# Patient Record
Sex: FEMALE | Race: WHITE | NOT HISPANIC OR LATINO | Employment: PART TIME | URBAN - METROPOLITAN AREA
[De-identification: names, ages, dates, MRNs, and addresses within clinical notes are randomized per-mention and may not be internally consistent; named-entity substitution may affect disease eponyms.]

---

## 2018-03-12 ENCOUNTER — OFFICE VISIT (OUTPATIENT)
Dept: URGENT CARE | Facility: CLINIC | Age: 33
End: 2018-03-12
Payer: COMMERCIAL

## 2018-03-12 VITALS
HEART RATE: 110 BPM | WEIGHT: 293 LBS | BODY MASS INDEX: 39.68 KG/M2 | TEMPERATURE: 100.7 F | DIASTOLIC BLOOD PRESSURE: 84 MMHG | OXYGEN SATURATION: 98 % | HEIGHT: 72 IN | RESPIRATION RATE: 20 BRPM | SYSTOLIC BLOOD PRESSURE: 136 MMHG

## 2018-03-12 DIAGNOSIS — J20.9 ACUTE BRONCHITIS, UNSPECIFIED ORGANISM: Primary | ICD-10-CM

## 2018-03-12 PROCEDURE — 99203 OFFICE O/P NEW LOW 30 MIN: CPT | Performed by: PHYSICIAN ASSISTANT

## 2018-03-12 RX ORDER — AZITHROMYCIN 250 MG/1
TABLET, FILM COATED ORAL
Qty: 6 TABLET | Refills: 0 | Status: SHIPPED | OUTPATIENT
Start: 2018-03-12 | End: 2018-03-16

## 2018-03-12 RX ORDER — METHYLPREDNISOLONE 4 MG/1
TABLET ORAL
Qty: 21 TABLET | Refills: 0 | Status: SHIPPED | OUTPATIENT
Start: 2018-03-12 | End: 2019-09-30 | Stop reason: ALTCHOICE

## 2018-03-12 NOTE — PATIENT INSTRUCTIONS

## 2018-03-12 NOTE — PROGRESS NOTES
[facility]        NAME: Mague Shipman is a 27 y o  female  : 1985    MRN: [id number]  DATE: 2018  TIME: 2:19 PM    Assessment and Plan   Acute bronchitis, unspecified organism [J20 9]  1  Acute bronchitis, unspecified organism  azithromycin (ZITHROMAX) 250 mg tablet    Methylprednisolone 4 MG TBPK         Patient Instructions     Seek care immediately if:   · You cough up blood  · Your lips or fingernails turn blue  · You feel like you are not getting enough air when you breathe  Follow up with PCP in 3-5 days  Proceed to  ER if symptoms worsen  Chief Complaint     Chief Complaint   Patient presents with    Cold Like Symptoms     C/O harsh cough, post nasal drip, sore throat and loss of voice x 3 days  Temp now is 100 7  History of Present Illness       Patient presents with a 3 day history of productive cough with yellow colored mucus burning her chest with coughing clear postnasal drip mild sore throat and low-grade fever  No abdominal pain nausea vomiting or diarrhea  Review of Systems   Review of Systems   Constitutional: Positive for activity change and fever  Negative for chills  HENT: Positive for congestion, postnasal drip, rhinorrhea, sore throat and voice change  Negative for ear pain, sneezing and trouble swallowing  Eyes: Negative for redness  Respiratory: Positive for cough  Negative for chest tightness, shortness of breath and wheezing  Gastrointestinal: Negative for abdominal pain, diarrhea, nausea and vomiting  Musculoskeletal: Negative for myalgias  Skin: Negative for rash  Neurological: Negative for dizziness, light-headedness and headaches  Hematological: Negative for adenopathy           Current Medications       Current Outpatient Prescriptions:     azithromycin (ZITHROMAX) 250 mg tablet, Take 2 tablets today then 1 tablet daily x 4 days, Disp: 6 tablet, Rfl: 0    Methylprednisolone 4 MG TBPK, Use as directed on package, Disp: 21 tablet, Rfl: 0    Current Allergies     Allergies as of 2018    (No Known Allergies)            The following portions of the patient's history were reviewed and updated as appropriate: allergies, current medications, past family history, past medical history, past social history, past surgical history and problem list      History reviewed  No pertinent past medical history  Past Surgical History:   Procedure Laterality Date     SECTION      x2    ENDOMETRIAL ABLATION         No family history on file  Medications have been verified  Objective   /84   Pulse (!) 110   Temp (!) 100 7 °F (38 2 °C)   Resp 20   Ht 6' 0 99" (1 854 m)   Wt 136 kg (299 lb 13 2 oz)   SpO2 98%   BMI 39 57 kg/m²        Physical Exam     Physical Exam   Constitutional: She is oriented to person, place, and time  She appears well-developed and well-nourished  HENT:   Head: Normocephalic and atraumatic  Right Ear: External ear normal    Left Ear: External ear normal    Nose: Nose normal    Mouth/Throat: Oropharynx is clear and moist    Eyes: Conjunctivae are normal    Neck: Neck supple  Cardiovascular: Normal rate, regular rhythm and normal heart sounds  Pulmonary/Chest: Effort normal    Musculoskeletal: Normal range of motion  Lymphadenopathy:     She has no cervical adenopathy  Neurological: She is alert and oriented to person, place, and time  Skin: Skin is warm and dry  No rash noted  Psychiatric: She has a normal mood and affect

## 2019-02-19 ENCOUNTER — TRANSCRIBE ORDERS (OUTPATIENT)
Dept: ADMINISTRATIVE | Facility: HOSPITAL | Age: 34
End: 2019-02-19

## 2019-02-19 DIAGNOSIS — N63.10 LUMP OF RIGHT BREAST: Primary | ICD-10-CM

## 2019-03-04 ENCOUNTER — HOSPITAL ENCOUNTER (OUTPATIENT)
Dept: MAMMOGRAPHY | Facility: HOSPITAL | Age: 34
Discharge: HOME | End: 2019-03-04
Payer: COMMERCIAL

## 2019-03-04 ENCOUNTER — HOSPITAL ENCOUNTER (OUTPATIENT)
Dept: ULTRASOUND IMAGING | Facility: HOSPITAL | Age: 34
Discharge: HOME | End: 2019-03-04
Payer: COMMERCIAL

## 2019-03-04 VITALS — BODY MASS INDEX: 39.68 KG/M2 | HEIGHT: 72 IN | WEIGHT: 293 LBS

## 2019-03-04 DIAGNOSIS — N63.10 LUMP OF RIGHT BREAST: ICD-10-CM

## 2019-03-04 PROCEDURE — 76642 ULTRASOUND BREAST LIMITED: CPT

## 2019-03-04 PROCEDURE — 77066 DX MAMMO INCL CAD BI: CPT

## 2019-03-04 PROCEDURE — G0279 TOMOSYNTHESIS, MAMMO: HCPCS

## 2019-09-30 ENCOUNTER — OFFICE VISIT (OUTPATIENT)
Dept: FAMILY MEDICINE CLINIC | Facility: CLINIC | Age: 34
End: 2019-09-30
Payer: COMMERCIAL

## 2019-09-30 VITALS
TEMPERATURE: 99.5 F | BODY MASS INDEX: 39.68 KG/M2 | HEART RATE: 87 BPM | OXYGEN SATURATION: 99 % | DIASTOLIC BLOOD PRESSURE: 86 MMHG | HEIGHT: 72 IN | SYSTOLIC BLOOD PRESSURE: 130 MMHG | WEIGHT: 293 LBS

## 2019-09-30 DIAGNOSIS — J01.90 ACUTE NON-RECURRENT SINUSITIS, UNSPECIFIED LOCATION: Primary | ICD-10-CM

## 2019-09-30 DIAGNOSIS — H65.01 NON-RECURRENT ACUTE SEROUS OTITIS MEDIA OF RIGHT EAR: ICD-10-CM

## 2019-09-30 PROCEDURE — 3008F BODY MASS INDEX DOCD: CPT | Performed by: FAMILY MEDICINE

## 2019-09-30 PROCEDURE — 99213 OFFICE O/P EST LOW 20 MIN: CPT | Performed by: FAMILY MEDICINE

## 2019-09-30 RX ORDER — SULFAMETHOXAZOLE AND TRIMETHOPRIM 800; 160 MG/1; MG/1
1 TABLET ORAL 2 TIMES DAILY
Qty: 20 TABLET | Refills: 0 | Status: SHIPPED | OUTPATIENT
Start: 2019-09-30 | End: 2019-10-10

## 2019-09-30 NOTE — PROGRESS NOTES
Assessment/Plan:    Acute non-recurrent sinusitis  Patient has had problems with amoxicillin in the past causing vaginal yeast infections  She requested a different antibiotic  I think that Bactrim DS should cover her for her otitis media as well as her sinusitis  I started the patient on Bactrim DS, take 1 tablet twice a day for 10 days  Patient has been advised to increase her fluid intake and rest   She can take an over-the-counter nasal decongestant as needed  Return to office if no improvement or worsens  Non-recurrent acute serous otitis media of right ear  As noted above, patient was started on Bactrim DS  I did not visualize any otorrhea  Therefore, I did not prescribe any ear drops  Patient was instructed to call me if she develops recurrent otorrhea  Diagnoses and all orders for this visit:    Acute non-recurrent sinusitis, unspecified location  -     sulfamethoxazole-trimethoprim (BACTRIM DS) 800-160 mg per tablet; Take 1 tablet by mouth 2 (two) times a day for 10 days    Non-recurrent acute serous otitis media of right ear          Subjective:      Patient ID: Dina Vickers is a 37 y o  female  This patient is a 41-year-old white female presents to the office today complaining of a right earache  She thought her right ear felt like it may have been draining  It felt wet  She reports sore throat, nasal congestion, runny nose, and a cough  Her cough is productive of yellow sputum  She denies any sinus pain or sinus pressure  She denies any fever or shaking chills  She denies any shortness of breath  The following portions of the patient's history were reviewed and updated as appropriate: allergies, current medications, past family history, past medical history, past social history, past surgical history and problem list     Review of Systems   Cardiovascular: Negative for chest pain, palpitations and leg swelling           Objective:      /86   Pulse 87   Temp 99 5 °F (37 5 °C) (Tympanic)   Ht 6' 1" (1 854 m)   Wt (!) 152 kg (336 lb 1 6 oz)   SpO2 99%   BMI 44 34 kg/m²          Physical Exam   Constitutional:   Patient is a 57-year-old white female who appears her stated age  She is nonseptic in appearance and in no apparent distress  HENT:   Head: Normocephalic and atraumatic  Right Ear: External ear normal    Left Ear: External ear normal    Mouth/Throat: Oropharynx is clear and moist  No oropharyngeal exudate  Left tympanic membrane was normal   There was no otorrhea noted  There was injection present of the right tympanic membrane  There is  tenderness to palpation over the maxillary sinuses  Nose was only mildly congested  Eyes: Pupils are equal, round, and reactive to light  Conjunctivae are normal  No scleral icterus  Neck: Neck supple  No tracheal deviation present  No thyromegaly present  Cardiovascular: Normal rate, regular rhythm and normal heart sounds  Exam reveals no gallop and no friction rub  No murmur heard  Pulmonary/Chest: Effort normal and breath sounds normal  No stridor  No respiratory distress  She has no wheezes  She has no rales  Lymphadenopathy:     She has no cervical adenopathy  Vitals reviewed  extremities:  Without cyanosis, clubbing, or edema

## 2019-09-30 NOTE — ASSESSMENT & PLAN NOTE
As noted above, patient was started on Bactrim DS  I did not visualize any otorrhea  Therefore, I did not prescribe any ear drops  Patient was instructed to call me if she develops recurrent otorrhea

## 2019-09-30 NOTE — ASSESSMENT & PLAN NOTE
Patient has had problems with amoxicillin in the past causing vaginal yeast infections  She requested a different antibiotic  I think that Bactrim DS should cover her for her otitis media as well as her sinusitis  I started the patient on Bactrim DS, take 1 tablet twice a day for 10 days  Patient has been advised to increase her fluid intake and rest   She can take an over-the-counter nasal decongestant as needed  Return to office if no improvement or worsens

## 2019-09-30 NOTE — PATIENT INSTRUCTIONS
Sinusitis   Bing Lisa CN & Jemima Batista PJ: Sinusitis  In: Morales HILL, ed  The 5-Minute Clinical Consult 2017, 25th ed  [facility],[address] [place] [address], 2017  Rolo ALTAMIRANO & Jane Reagan ME: Sinusitis (Rhinosinusitis)  In: Odalis Lacey, Suzanne YOST, [facility], et al, eds  [address] Emergency Medicine Consult, 5th ed  [facility],[address] [place] [address], 5476  Afarian HM: Sinusitis and Rhinitis  In: Carroll Niño, ed  [facility] Clinical Practice of Emergency Medicine, 6th ed  [facility],[address] [place] [address], 7414  Donato ROSENTHAL & Riley J: Sinusitis  In: Erick Lopez MD, ed  The 5-Minute Pediatric Consult Standard, 7th ed  [facility],[address] [place] [address], 4189  Pauly YOST, Paresh HARPER, Cody SS, et al: Clinical practice guideline (update): adult sinusitis  Otolaryngol Head Neck Surg 2015; 152(2 Suppl):S1-S39  Nancy Second AK: Sinusitis  In: Morales HILL, ed  The 5-Minute Clinical Consult 2012, 20th ed  [facility],[address] [place] [address], 2012 © 2017 [address]  Information is for End User's use only and may not be sold, redistributed or otherwise used for commercial purposes  All illustrations and images included in CareNotes® are the copyrighted property of [employer] , [employer]  or Delon Navarrete  The above information is an  only  It is not intended as medical advice for individual conditions or treatments  Talk to your doctor, nurse or pharmacist before following any medical regimen to see if it is safe and effective for you

## 2020-02-18 ENCOUNTER — OFFICE VISIT (OUTPATIENT)
Dept: FAMILY MEDICINE CLINIC | Facility: CLINIC | Age: 35
End: 2020-02-18
Payer: COMMERCIAL

## 2020-02-18 VITALS
OXYGEN SATURATION: 100 % | TEMPERATURE: 99.3 F | DIASTOLIC BLOOD PRESSURE: 82 MMHG | SYSTOLIC BLOOD PRESSURE: 132 MMHG | BODY MASS INDEX: 39.68 KG/M2 | WEIGHT: 293 LBS | HEIGHT: 72 IN | HEART RATE: 75 BPM

## 2020-02-18 DIAGNOSIS — J01.90 ACUTE NON-RECURRENT SINUSITIS, UNSPECIFIED LOCATION: Primary | ICD-10-CM

## 2020-02-18 PROCEDURE — 3008F BODY MASS INDEX DOCD: CPT | Performed by: FAMILY MEDICINE

## 2020-02-18 PROCEDURE — 1036F TOBACCO NON-USER: CPT | Performed by: FAMILY MEDICINE

## 2020-02-18 PROCEDURE — 99213 OFFICE O/P EST LOW 20 MIN: CPT | Performed by: FAMILY MEDICINE

## 2020-02-18 RX ORDER — CEFUROXIME AXETIL 250 MG/1
250 TABLET ORAL EVERY 12 HOURS SCHEDULED
Qty: 20 TABLET | Refills: 0 | Status: SHIPPED | OUTPATIENT
Start: 2020-02-18 | End: 2020-02-28

## 2020-02-18 RX ORDER — BENZONATATE 100 MG/1
CAPSULE ORAL
Qty: 40 CAPSULE | Refills: 0 | Status: SHIPPED | OUTPATIENT
Start: 2020-02-18 | End: 2020-03-03

## 2020-02-18 NOTE — PATIENT INSTRUCTIONS
Sinusitis   WHAT YOU NEED TO KNOW:   What is sinusitis? Sinusitis is inflammation or infection of your sinuses  It is most often caused by a virus  Acute sinusitis may last up to 12 weeks  Chronic sinusitis lasts longer than 12 weeks  Recurrent sinusitis means you have 4 or more times in 1 year  What increases my risk for sinusitis? · Medical conditions, such as an upper respiratory infection, allergies, asthma, or cystic fibrosis    · Dental infections or procedures, such as gum infections, tooth decay, or a root canal    · Smoking    · Abnormal sinus structure, such as nasal growths, swollen tonsils, or a deviated septum    · A weak immune system, from diseases such as diabetes or HIV  What are the signs and symptoms of sinusitis? · Fever    · Pain, pressure, redness, or swelling around the forehead, cheeks, or eyes    · Thick yellow or green discharge from your nose    · Tenderness when you touch your face over your sinuses    · Dry cough that happens mostly at night or when you lie down    · Headache and face pain that is worse when you lean forward    · Tooth pain, or pain when you chew  How is sinusitis diagnosed? Your healthcare provider will examine you and ask about your symptoms  He or she will check inside your nose using a nasal speculum  This is a small tool used to open your nostrils  A sample of the mucus from your nose may show what germ is causing your infection  How is sinusitis treated? Your symptoms may go away on their own  Your healthcare provider may recommend watchful waiting for up to 10 days before starting antibiotics  You may  need any of the following:  · Acetaminophen  decreases pain and fever  It is available without a doctor's order  Ask how much to take and how often to take it  Follow directions   Read the labels of all other medicines you are using to see if they also contain acetaminophen, or ask your doctor or pharmacist  Acetaminophen can cause liver damage if not taken correctly  Do not use more than 4 grams (4,000 milligrams) total of acetaminophen in one day  · NSAIDs , such as ibuprofen, help decrease swelling, pain, and fever  This medicine is available with or without a doctor's order  NSAIDs can cause stomach bleeding or kidney problems in certain people  If you take blood thinner medicine, always ask your healthcare provider if NSAIDs are safe for you  Always read the medicine label and follow directions  · Nasal steroid sprays  may help decrease inflammation in your nose and sinuses  · Decongestants  help reduce swelling and drain mucus in the nose and sinuses  They may help you breathe easier  · Antihistamines  help dry mucus in the nose and relieve sneezing  · Antibiotics  help treat or prevent a bacterial infection  How can I manage my symptoms? · Rinse your sinuses  Use a sinus rinse device to rinse your nasal passages with a saline (salt water) solution or distilled water  Do not use tap water  This will help thin the mucus in your nose and rinse away pollen and dirt  It will also help reduce swelling so you can breathe normally  Ask your healthcare provider how often to do this  · Breathe in steam   Heat a bowl of water until you see steam  Lean over the bowl and make a tent over your head with a large towel  Breathe deeply for about 20 minutes  Be careful not to get too close to the steam or burn yourself  Do this 3 times a day  You can also breathe deeply when you take a hot shower  · Sleep with your head elevated  Place an extra pillow under your head before you go to sleep to help your sinuses drain  · Drink liquids as directed  Ask your healthcare provider how much liquid to drink each day and which liquids are best for you  Liquids will thin the mucus in your nose and help it drain  Avoid drinks that contain alcohol or caffeine  · Do not smoke, and avoid secondhand smoke    Nicotine and other chemicals in cigarettes and cigars can make your symptoms worse  Ask your healthcare provider for information if you currently smoke and need help to quit  E-cigarettes or smokeless tobacco still contain nicotine  Talk to your healthcare provider before you use these products  How can I help prevent the spread of germs that cause sinusitis? Wash your hands often with soap and water  Wash your hands after you use the bathroom, change a child's diaper, or sneeze  Wash your hands before you prepare or eat food  When should I seek immediate care? · Your eye and eyelid are red, swollen, and painful  · You cannot open your eye  · You have vision changes, such as double vision  · Your eyeball bulges out or you cannot move your eye  · You are more sleepy than normal, or you notice changes in your ability to think, move, or talk  · You have a stiff neck, a fever, or a bad headache  · You have swelling of your forehead or scalp  When should I contact my healthcare provider? · Your symptoms do not improve after 3 days  · Your symptoms do not go away after 10 days  · You have nausea and are vomiting  · Your nose is bleeding  · You have questions or concerns about your condition or care  CARE AGREEMENT:   You have the right to help plan your care  Learn about your health condition and how it may be treated  Discuss treatment options with your caregivers to decide what care you want to receive  You always have the right to refuse treatment  The above information is an  only  It is not intended as medical advice for individual conditions or treatments  Talk to your doctor, nurse or pharmacist before following any medical regimen to see if it is safe and effective for you  © 2017 [employer] [employer] Information is for End User's use only and may not be sold, redistributed or otherwise used for commercial purposes   All illustrations and images included in CareNotes® are the copyrighted property of A D A M , Inc  or Delon Navarrete

## 2020-02-18 NOTE — PROGRESS NOTES
Assessment/Plan:    Acute non-recurrent sinusitis  The patient was started on Ceftin 250 mg  She will take 1 tablet twice a day with food for 10 days  She has been advised to increase her fluid intake and rest   She may take Tylenol as needed  For cough, she was prescribed benzonatate 100 mg  She may take 1 or 2 3 times per day as needed for cough  Patient has been advised to return to office if no improvement or worsens  I noted the patient's 18 lb weight gain  The patient is trying hard to lose weight  I commended on her weight loss and encouraged her to continue to try to lose weight  Diagnoses and all orders for this visit:    Acute non-recurrent sinusitis, unspecified location  -     benzonatate (TESSALON PERLES) 100 mg capsule; Take 1-2 three times a day as needed for cough  -     cefuroxime (CEFTIN) 250 mg tablet; Take 1 tablet (250 mg total) by mouth every 12 (twelve) hours for 10 days           Subjective:      Patient ID: Patrizia Pratt is a 37 y o  female  This is a 56-year-old white female presents to the office today complaining of sinus pain and sinus pressure  She complains of cough, nasal congestion, a sore throat  Her symptoms began on February 13  She denies any fever or chills  She denies shortness of breath  She reports a green nasal discharge  She has also noted green sputum  The following portions of the patient's history were reviewed and updated as appropriate: allergies, current medications, past family history, past medical history, past social history, past surgical history and problem list     Review of Systems   Gastrointestinal: Negative for abdominal distention, constipation, diarrhea, nausea and vomiting           Objective:      /82 (BP Location: Left arm, Patient Position: Sitting, Cuff Size: Large)   Pulse 75   Temp 99 3 °F (37 4 °C) (Tympanic)   Ht 6' 1" (1 854 m)   Wt (!) 145 kg (318 lb 9 6 oz)   SpO2 100%   BMI 42 03 kg/m²          Physical Exam   Constitutional: She appears well-developed and well-nourished  No distress  HENT:   Head: Normocephalic and atraumatic  Right Ear: External ear normal    Left Ear: External ear normal    Mouth/Throat: Oropharynx is clear and moist  No oropharyngeal exudate  Tympanic membranes are clear  Severe nasal congestion is present  There was no rhinorrhea noted  There is tenderness noted to palpation over the maxillary sinuses bilaterally  Eyes: Pupils are equal, round, and reactive to light  Conjunctivae are normal  No scleral icterus  Neck: Neck supple  No tracheal deviation present  No thyromegaly present  Cardiovascular: Normal rate, regular rhythm and normal heart sounds  Exam reveals no gallop and no friction rub  No murmur heard  Pulmonary/Chest: Effort normal and breath sounds normal  No stridor  No respiratory distress  She has no wheezes  She has no rales  Abdominal: Soft  Bowel sounds are normal  She exhibits no distension and no mass  There is no tenderness  There is no guarding  There is no organomegaly   Lymphadenopathy:     She has no cervical adenopathy  Vitals reviewed      extremities:  Without cyanosis, clubbing, or edema

## 2020-02-19 NOTE — ASSESSMENT & PLAN NOTE
The patient was started on Ceftin 250 mg  She will take 1 tablet twice a day with food for 10 days  She has been advised to increase her fluid intake and rest   She may take Tylenol as needed  For cough, she was prescribed benzonatate 100 mg  She may take 1 or 2 3 times per day as needed for cough  Patient has been advised to return to office if no improvement or worsens  I noted the patient's 18 lb weight gain  The patient is trying hard to lose weight  I commended on her weight loss and encouraged her to continue to try to lose weight

## 2020-03-04 ENCOUNTER — TELEPHONE (OUTPATIENT)
Dept: FAMILY MEDICINE CLINIC | Facility: CLINIC | Age: 35
End: 2020-03-04

## 2020-03-04 DIAGNOSIS — J01.90 ACUTE NON-RECURRENT SINUSITIS, UNSPECIFIED LOCATION: Primary | ICD-10-CM

## 2020-03-04 RX ORDER — CEFDINIR 300 MG/1
300 CAPSULE ORAL EVERY 12 HOURS SCHEDULED
Qty: 20 CAPSULE | Refills: 0 | Status: SHIPPED | OUTPATIENT
Start: 2020-03-04 | End: 2020-03-14

## 2020-03-04 NOTE — TELEPHONE ENCOUNTER
Pt seen in office 2 wks ago, felt well for a few days, but now has stuffy, runny nose, cough, yellow/green mucus  No fever/ x3 days     Taking mussenx        Arlen Espinosa

## 2020-10-30 DIAGNOSIS — Z20.822 ENCOUNTER FOR SCREENING LABORATORY TESTING FOR COVID-19 VIRUS: Primary | ICD-10-CM

## 2020-10-30 DIAGNOSIS — Z20.822 ENCOUNTER FOR SCREENING LABORATORY TESTING FOR COVID-19 VIRUS: ICD-10-CM

## 2020-10-30 PROCEDURE — U0003 INFECTIOUS AGENT DETECTION BY NUCLEIC ACID (DNA OR RNA); SEVERE ACUTE RESPIRATORY SYNDROME CORONAVIRUS 2 (SARS-COV-2) (CORONAVIRUS DISEASE [COVID-19]), AMPLIFIED PROBE TECHNIQUE, MAKING USE OF HIGH THROUGHPUT TECHNOLOGIES AS DESCRIBED BY CMS-2020-01-R: HCPCS | Performed by: FAMILY MEDICINE

## 2020-10-31 LAB — SARS-COV-2 RNA SPEC QL NAA+PROBE: NOT DETECTED

## 2020-11-02 ENCOUNTER — TELEPHONE (OUTPATIENT)
Dept: FAMILY MEDICINE CLINIC | Facility: CLINIC | Age: 35
End: 2020-11-02

## 2020-11-23 ENCOUNTER — OFFICE VISIT (OUTPATIENT)
Dept: FAMILY MEDICINE CLINIC | Facility: CLINIC | Age: 35
End: 2020-11-23
Payer: COMMERCIAL

## 2020-11-23 VITALS
WEIGHT: 293 LBS | HEIGHT: 72 IN | DIASTOLIC BLOOD PRESSURE: 82 MMHG | HEART RATE: 103 BPM | BODY MASS INDEX: 39.68 KG/M2 | SYSTOLIC BLOOD PRESSURE: 130 MMHG | TEMPERATURE: 97.6 F | OXYGEN SATURATION: 98 %

## 2020-11-23 DIAGNOSIS — Z02.1 ENCOUNTER FOR PRE-EMPLOYMENT EXAMINATION: Primary | ICD-10-CM

## 2020-11-23 DIAGNOSIS — Z23 IMMUNIZATION DUE: ICD-10-CM

## 2020-11-23 PROCEDURE — 1036F TOBACCO NON-USER: CPT | Performed by: FAMILY MEDICINE

## 2020-11-23 PROCEDURE — 90686 IIV4 VACC NO PRSV 0.5 ML IM: CPT

## 2020-11-23 PROCEDURE — 90471 IMMUNIZATION ADMIN: CPT

## 2020-11-23 PROCEDURE — 86580 TB INTRADERMAL TEST: CPT

## 2020-11-23 PROCEDURE — 3725F SCREEN DEPRESSION PERFORMED: CPT | Performed by: FAMILY MEDICINE

## 2020-11-23 PROCEDURE — 3008F BODY MASS INDEX DOCD: CPT | Performed by: FAMILY MEDICINE

## 2020-11-23 PROCEDURE — 99213 OFFICE O/P EST LOW 20 MIN: CPT | Performed by: FAMILY MEDICINE

## 2021-11-10 ENCOUNTER — OFFICE VISIT (OUTPATIENT)
Dept: URGENT CARE | Facility: CLINIC | Age: 36
End: 2021-11-10
Payer: COMMERCIAL

## 2021-11-10 VITALS
RESPIRATION RATE: 18 BRPM | OXYGEN SATURATION: 98 % | HEIGHT: 72 IN | HEART RATE: 79 BPM | WEIGHT: 293 LBS | TEMPERATURE: 97.9 F | BODY MASS INDEX: 39.68 KG/M2

## 2021-11-10 DIAGNOSIS — H66.92 LEFT OTITIS MEDIA, UNSPECIFIED OTITIS MEDIA TYPE: Primary | ICD-10-CM

## 2021-11-10 PROCEDURE — 99213 OFFICE O/P EST LOW 20 MIN: CPT | Performed by: PHYSICIAN ASSISTANT

## 2021-11-10 RX ORDER — AMOXICILLIN 500 MG/1
500 TABLET, FILM COATED ORAL 2 TIMES DAILY
Qty: 14 TABLET | Refills: 0 | Status: SHIPPED | OUTPATIENT
Start: 2021-11-10 | End: 2021-11-17

## 2022-05-20 ENCOUNTER — OFFICE VISIT (OUTPATIENT)
Dept: URGENT CARE | Facility: CLINIC | Age: 37
End: 2022-05-20
Payer: COMMERCIAL

## 2022-05-20 VITALS — TEMPERATURE: 98.4 F | RESPIRATION RATE: 18 BRPM | HEART RATE: 94 BPM | OXYGEN SATURATION: 98 %

## 2022-05-20 DIAGNOSIS — J20.8 ACUTE BRONCHITIS DUE TO OTHER SPECIFIED ORGANISMS: Primary | ICD-10-CM

## 2022-05-20 PROCEDURE — 99213 OFFICE O/P EST LOW 20 MIN: CPT | Performed by: NURSE PRACTITIONER

## 2022-05-20 RX ORDER — AZITHROMYCIN 250 MG/1
TABLET, FILM COATED ORAL
Qty: 6 TABLET | Refills: 0 | Status: SHIPPED | OUTPATIENT
Start: 2022-05-20 | End: 2022-05-24

## 2022-05-20 NOTE — PROGRESS NOTES
[facility]        NAME: Yaritza Jensen is a 43 y o  female  : 1985    MRN: [id number]  DATE: May 20, 2022  TIME: 3:54 PM    Assessment and Plan   Acute bronchitis due to other specified organisms [J20 8]  1  Acute bronchitis due to other specified organisms  azithromycin (ZITHROMAX) 250 mg tablet         Patient Instructions       Follow up with PCP in 3-5 days  Proceed to  ER if symptoms worsen  You appear to have bronchitis  Take the azithromycin as prescribed  You are to get an OTC cold relief product such as dayquil as well  Follow up with your PCP  Go to the eD if symptoms worsen      Chief Complaint     Chief Complaint   Patient presents with    Cough     Stuffy/runny nose, started 2 weeks ago         History of Present Illness       This is a 43year old female who states has had cough, congestion x 2 weeks and over the last few days has gotten worse  She states she has only been taking mucinex w/o relief  + covid vaccination  Denies fevers, chills, n/v/d  Coughing up yellow phlegm       Review of Systems   Review of Systems   Constitutional: Negative  HENT: Positive for congestion and sore throat  Eyes: Negative  Respiratory: Positive for cough  Cardiovascular: Negative  Gastrointestinal: Negative  Endocrine: Negative  Genitourinary: Negative  Musculoskeletal: Negative  Skin: Negative  Allergic/Immunologic: Negative  Neurological: Negative  Hematological: Negative  Psychiatric/Behavioral: Negative            Current Medications       Current Outpatient Medications:     azithromycin (ZITHROMAX) 250 mg tablet, Take 2 tablets today then 1 tablet daily x 4 days, Disp: 6 tablet, Rfl: 0    Current Allergies     Allergies as of 2022    (No Known Allergies)            The following portions of the patient's history were reviewed and updated as appropriate: allergies, current medications, past family history, past medical history, past social history, past surgical history and problem list      Past Medical History:   Diagnosis Date    Non-recurrent acute serous otitis media of right ear 2019       Past Surgical History:   Procedure Laterality Date     SECTION      x2    ENDOMETRIAL ABLATION         Family History   Problem Relation Age of Onset    Thyroid cancer Mother     No Known Problems Father          Medications have been verified  Objective   Pulse 94   Temp 98 4 °F (36 9 °C)   Resp 18   SpO2 98%   No LMP recorded  Physical Exam     Physical Exam  Vitals and nursing note reviewed  Constitutional:       General: She is not in acute distress  Appearance: Normal appearance  She is obese  She is not ill-appearing, toxic-appearing or diaphoretic  HENT:      Head: Normocephalic and atraumatic  Right Ear: Tympanic membrane and ear canal normal       Left Ear: Tympanic membrane normal       Nose: Congestion and rhinorrhea present  Mouth/Throat:      Mouth: Mucous membranes are moist       Pharynx: Oropharynx is clear  No oropharyngeal exudate or posterior oropharyngeal erythema  Eyes:      Extraocular Movements: Extraocular movements intact  Cardiovascular:      Rate and Rhythm: Normal rate and regular rhythm  Pulses: Normal pulses  Heart sounds: Normal heart sounds  Pulmonary:      Effort: Pulmonary effort is normal       Breath sounds: Rhonchi present  Comments: Coarse rhonchi through out no wheezes   Abdominal:      General: There is no distension  Palpations: Abdomen is soft  Tenderness: There is no abdominal tenderness  Musculoskeletal:         General: Normal range of motion  Cervical back: Normal range of motion and neck supple  Skin:     General: Skin is warm and dry  Neurological:      General: No focal deficit present  Mental Status: She is alert and oriented to person, place, and time     Psychiatric:         Mood and Affect: Mood normal  Behavior: Behavior normal          Thought Content:  Thought content normal          Judgment: Judgment normal

## 2022-05-20 NOTE — PATIENT INSTRUCTIONS
You appear to have bronchitis  Take the azithromycin as prescribed    You are to get an OTC cold relief product such as dayquil as well  Follow up with your PCP  Go to the eD if symptoms worsen

## 2022-05-23 ENCOUNTER — TELEPHONE (OUTPATIENT)
Dept: FAMILY MEDICINE CLINIC | Facility: CLINIC | Age: 37
End: 2022-05-23

## 2022-05-23 NOTE — TELEPHONE ENCOUNTER
I haven't seen this patient in 2 years  Needs an appointment  Don't know if they COVID tested her  They should have

## 2022-05-23 NOTE — TELEPHONE ENCOUNTER
Pt stats she isn't feeling any better after taking the Sherly Flakes she was given at [facility] Friday 05/20/22  Stats she is still coughing and congested, has been for 2 wks now with no fever      Please advise

## 2022-05-23 NOTE — TELEPHONE ENCOUNTER
Pt understands she has one more day with her antibiotic and will notify the [facility] if she feels worse  Pt agrees to call our office to schedule an jose when she's ready

## 2022-09-10 ENCOUNTER — OFFICE VISIT (OUTPATIENT)
Dept: URGENT CARE | Facility: CLINIC | Age: 37
End: 2022-09-10
Payer: COMMERCIAL

## 2022-09-10 VITALS
HEIGHT: 72 IN | HEART RATE: 113 BPM | SYSTOLIC BLOOD PRESSURE: 129 MMHG | RESPIRATION RATE: 18 BRPM | DIASTOLIC BLOOD PRESSURE: 79 MMHG | OXYGEN SATURATION: 98 % | TEMPERATURE: 98.4 F | WEIGHT: 293 LBS | BODY MASS INDEX: 39.68 KG/M2

## 2022-09-10 DIAGNOSIS — J20.8 VIRAL BRONCHITIS: Primary | ICD-10-CM

## 2022-09-10 PROCEDURE — 99213 OFFICE O/P EST LOW 20 MIN: CPT | Performed by: PHYSICIAN ASSISTANT

## 2022-09-10 RX ORDER — BENZONATATE 100 MG/1
100 CAPSULE ORAL 3 TIMES DAILY PRN
Qty: 20 CAPSULE | Refills: 0 | Status: SHIPPED | OUTPATIENT
Start: 2022-09-10

## 2022-09-10 RX ORDER — ALBUTEROL SULFATE 90 UG/1
2 AEROSOL, METERED RESPIRATORY (INHALATION) EVERY 6 HOURS PRN
Qty: 8.5 G | Refills: 0 | Status: SHIPPED | OUTPATIENT
Start: 2022-09-10

## 2022-09-10 RX ORDER — METHYLPREDNISOLONE 4 MG/1
TABLET ORAL
Qty: 1 EACH | Refills: 0 | Status: SHIPPED | OUTPATIENT
Start: 2022-09-10

## 2022-09-10 NOTE — PROGRESS NOTES
[facility]        NAME: Nella Fritz is a 43 y o  female  : 1985    MRN: [id number]  DATE: September 10, 2022  TIME: 12:59 PM    Assessment and Plan   Viral bronchitis [J20 8]  1  Viral bronchitis  benzonatate (TESSALON PERLES) 100 mg capsule    methylPREDNISolone 4 MG tablet therapy pack    albuterol (ProAir HFA) 90 mcg/act inhaler     Patient Instructions     Take medicine as prescribed  Follow up with PCP in 3-5 days  Proceed to  ER if symptoms worsen  Chief Complaint     Chief Complaint   Patient presents with    Cold Like Symptoms     Patient c/o cough, runny nose, chest, and head congestion x 4 days  History of Present Illness     Cough  This is a new problem  The cough is productive of purulent sputum  Associated symptoms include nasal congestion, rhinorrhea, shortness of breath and wheezing  Pertinent negatives include no chest pain, chills, ear pain, fever, myalgias, postnasal drip or sore throat  Treatments tried: mucinex and sudafed  Her past medical history is significant for bronchitis  Patient refuses covid test and CXR  Review of Systems   Review of Systems   Constitutional: Negative for chills, fatigue and fever  HENT: Positive for congestion and rhinorrhea  Negative for ear pain, postnasal drip and sore throat  Respiratory: Positive for cough, chest tightness, shortness of breath and wheezing  Cardiovascular: Negative for chest pain and palpitations  Gastrointestinal: Negative for abdominal pain, diarrhea, nausea and vomiting  Musculoskeletal: Negative for myalgias           Current Medications       Current Outpatient Medications:     albuterol (ProAir HFA) 90 mcg/act inhaler, Inhale 2 puffs every 6 (six) hours as needed for wheezing, Disp: 8 5 g, Rfl: 0    benzonatate (TESSALON PERLES) 100 mg capsule, Take 1 capsule (100 mg total) by mouth 3 (three) times a day as needed for cough, Disp: 20 capsule, Rfl: 0    methylPREDNISolone 4 MG tablet therapy pack, Use as directed on package, Disp: 1 each, Rfl: 0    Current Allergies     Allergies as of 09/10/2022    (No Known Allergies)            The following portions of the patient's history were reviewed and updated as appropriate: allergies, current medications, past family history, past medical history, past social history, past surgical history and problem list      Past Medical History:   Diagnosis Date    Non-recurrent acute serous otitis media of right ear 2019       Past Surgical History:   Procedure Laterality Date     SECTION      x2    CHOLECYSTECTOMY      ENDOMETRIAL ABLATION         Family History   Problem Relation Age of Onset    Thyroid cancer Mother     No Known Problems Father          Medications have been verified  Objective   /79   Pulse (!) 113   Temp 98 4 °F (36 9 °C) (Oral)   Resp 18   Ht 6' 1" (1 854 m)   Wt (!) 152 kg (336 lb)   SpO2 98%   BMI 44 33 kg/m²   No LMP recorded  Patient has had an ablation  Physical Exam     Physical Exam  Constitutional:       Appearance: Normal appearance  She is well-developed  HENT:      Head: Normocephalic  Right Ear: Hearing, tympanic membrane, ear canal and external ear normal       Left Ear: Hearing, tympanic membrane, ear canal and external ear normal       Nose: Mucosal edema and congestion present  No rhinorrhea  Mouth/Throat:      Pharynx: No oropharyngeal exudate or posterior oropharyngeal erythema  Tonsils: No tonsillar exudate  Cardiovascular:      Rate and Rhythm: Normal rate and regular rhythm  Heart sounds: Normal heart sounds  No murmur heard  No friction rub  No gallop  Pulmonary:      Effort: Pulmonary effort is normal  No respiratory distress  Breath sounds: Normal breath sounds  No stridor  No decreased breath sounds, wheezing (scattered), rhonchi or rales        Comments: Harsh dry cough  Abdominal:      General: Bowel sounds are normal  There is no distension  Palpations: Abdomen is soft  There is no mass  Tenderness: There is no abdominal tenderness  There is no guarding or rebound  Lymphadenopathy:      Cervical: No cervical adenopathy  Right cervical: No superficial cervical adenopathy  Left cervical: No superficial cervical adenopathy  Neurological:      Mental Status: She is alert

## 2022-11-28 ENCOUNTER — OFFICE VISIT (OUTPATIENT)
Dept: URGENT CARE | Facility: MEDICAL CENTER | Age: 37
End: 2022-11-28

## 2022-11-28 VITALS
HEIGHT: 72 IN | RESPIRATION RATE: 20 BRPM | BODY MASS INDEX: 39.68 KG/M2 | OXYGEN SATURATION: 98 % | DIASTOLIC BLOOD PRESSURE: 70 MMHG | SYSTOLIC BLOOD PRESSURE: 120 MMHG | TEMPERATURE: 98.2 F | HEART RATE: 98 BPM | WEIGHT: 293 LBS

## 2022-11-28 DIAGNOSIS — H10.9 CONJUNCTIVITIS OF LEFT EYE, UNSPECIFIED CONJUNCTIVITIS TYPE: ICD-10-CM

## 2022-11-28 DIAGNOSIS — J01.90 ACUTE SINUSITIS, RECURRENCE NOT SPECIFIED, UNSPECIFIED LOCATION: Primary | ICD-10-CM

## 2022-11-28 RX ORDER — PREDNISOLONE ACETATE 10 MG/ML
1 SUSPENSION/ DROPS OPHTHALMIC 4 TIMES DAILY
Qty: 5 ML | Refills: 0 | Status: SHIPPED | OUTPATIENT
Start: 2022-11-28

## 2022-11-28 RX ORDER — AMOXICILLIN 500 MG/1
500 CAPSULE ORAL EVERY 8 HOURS SCHEDULED
Qty: 21 CAPSULE | Refills: 0 | Status: SHIPPED | OUTPATIENT
Start: 2022-11-28 | End: 2022-12-05

## 2022-11-28 NOTE — PROGRESS NOTES
[facility]        NAME: Martin Zambrano is a 42 y o  female  : 1985    MRN: [id number]  DATE: 2022  TIME: 12:59 PM    Assessment and Plan   Acute sinusitis, recurrence not specified, unspecified location [J01 90]  1  Acute sinusitis, recurrence not specified, unspecified location  amoxicillin (AMOXIL) 500 mg capsule      2  Conjunctivitis of left eye, unspecified conjunctivitis type  prednisoLONE acetate (PRED FORTE) 1 % ophthalmic suspension        Patient's eye sx are very mild however daughter had pink eye recently  Pt states she's allergic to various antibiotic eye drops and that prednisolone acetate 1% has worked for her in the past  Pt brought the old bottle in today  Patient Instructions     Take prescription medication as instructed  Make sure to wash pillow cases and sheets daily  May continue flonase  Drink plenty of fluids and rest   Follow up with PCP in 3-5 days  Proceed to  ER if symptoms worsen  Chief Complaint     Chief Complaint   Patient presents with   • Sinusitis     Sinus congestion and pressure that started 3 weeks ago, pt  Taking mucinex, dayquil, nasal spray, neti pot with no improvement, reports cough and sore throat   • Conjunctivitis     Left eye redness, irritation, pt  Daughter had pink eye on Saturday          History of Present Illness       42 yoF here with sinus congestion and pressure x3 weeks  Sx have been constant  Pt has tried mucinex, daquil, flonase, and netipot without relief  Pt also has left eye redness and drainage worse in the morning x2 days  Daughter had pink eye last week  Pt also admits to cough and sore throat  Denies fever, chills, chest pain, SOB, abd pain, nausea, vomiting, diarrhea  Sinusitis  Associated symptoms include congestion, coughing, sinus pressure and a sore throat  Pertinent negatives include no chills or ear pain     Conjunctivitis   Associated symptoms include eye itching, congestion, sore throat, cough, eye discharge and eye redness  Pertinent negatives include no fever, no abdominal pain, no diarrhea, no nausea, no vomiting, no ear discharge and no ear pain  Review of Systems   Review of Systems   Constitutional: Negative for chills and fever  HENT: Positive for congestion, sinus pressure, sinus pain and sore throat  Negative for ear discharge and ear pain  Eyes: Positive for discharge, redness and itching  Respiratory: Positive for cough  Cardiovascular: Negative for chest pain  Gastrointestinal: Negative for abdominal pain, diarrhea, nausea and vomiting  Musculoskeletal: Negative  Skin: Negative  Neurological: Negative            Current Medications       Current Outpatient Medications:   •  amoxicillin (AMOXIL) 500 mg capsule, Take 1 capsule (500 mg total) by mouth every 8 (eight) hours for 7 days, Disp: 21 capsule, Rfl: 0  •  prednisoLONE acetate (PRED FORTE) 1 % ophthalmic suspension, Administer 1 drop into the left eye 4 (four) times a day, Disp: 5 mL, Rfl: 0  •  albuterol (ProAir HFA) 90 mcg/act inhaler, Inhale 2 puffs every 6 (six) hours as needed for wheezing (Patient not taking: Reported on 11/28/2022), Disp: 8 5 g, Rfl: 0  •  benzonatate (TESSALON PERLES) 100 mg capsule, Take 1 capsule (100 mg total) by mouth 3 (three) times a day as needed for cough (Patient not taking: Reported on 11/28/2022), Disp: 20 capsule, Rfl: 0  •  methylPREDNISolone 4 MG tablet therapy pack, Use as directed on package (Patient not taking: Reported on 11/28/2022), Disp: 1 each, Rfl: 0    Current Allergies     Allergies as of 11/28/2022   • (No Known Allergies)            The following portions of the patient's history were reviewed and updated as appropriate: allergies, current medications, past family history, past medical history, past social history, past surgical history and problem list      Past Medical History:   Diagnosis Date   • Non-recurrent acute serous otitis media of right ear 9/30/2019 Past Surgical History:   Procedure Laterality Date   •  SECTION      x2   • CHOLECYSTECTOMY     • ENDOMETRIAL ABLATION         Family History   Problem Relation Age of Onset   • Thyroid cancer Mother    • No Known Problems Father          Medications have been verified  Objective   /70   Pulse 98   Temp 98 2 °F (36 8 °C)   Resp 20   Ht 6' 1" (1 854 m)   Wt 136 kg (300 lb)   SpO2 98%   BMI 39 58 kg/m²        Physical Exam     Physical Exam  Constitutional:       General: She is not in acute distress  Appearance: Normal appearance  She is not ill-appearing  HENT:      Head: Normocephalic and atraumatic  Right Ear: Tympanic membrane, ear canal and external ear normal  There is no impacted cerumen  Left Ear: Tympanic membrane, ear canal and external ear normal  There is no impacted cerumen  Nose: Congestion present  No rhinorrhea  Right Sinus: Maxillary sinus tenderness and frontal sinus tenderness present  Left Sinus: Maxillary sinus tenderness and frontal sinus tenderness present  Mouth/Throat:      Mouth: Mucous membranes are moist       Pharynx: Posterior oropharyngeal erythema (mild) present  No oropharyngeal exudate  Tonsils: No tonsillar exudate or tonsillar abscesses  Eyes:      General:         Left eye: Discharge (mild) present  Extraocular Movements: Extraocular movements intact  Conjunctiva/sclera:      Right eye: Right conjunctiva is not injected  Left eye: Left conjunctiva is injected (mild)  Cardiovascular:      Rate and Rhythm: Normal rate and regular rhythm  Pulses: Normal pulses  Heart sounds: Normal heart sounds  No murmur heard  No friction rub  No gallop  Pulmonary:      Effort: Pulmonary effort is normal  No respiratory distress  Breath sounds: Normal breath sounds  No wheezing, rhonchi or rales  Skin:     General: Skin is warm and dry        Capillary Refill: Capillary refill takes less than 2 seconds  Findings: No rash  Neurological:      General: No focal deficit present  Mental Status: She is alert

## 2022-11-28 NOTE — PATIENT INSTRUCTIONS
Sinusitis   WHAT YOU NEED TO KNOW:   Sinusitis is inflammation or infection of your sinuses  Sinusitis is most often caused by a virus  Acute sinusitis may last up to 12 weeks  Chronic sinusitis lasts longer than 12 weeks  Recurrent sinusitis means you have 4 or more infections in 1 year  DISCHARGE INSTRUCTIONS:   Return to the emergency department if:   You have trouble breathing or wheezing that is getting worse  You have a stiff neck, a fever, or a bad headache  You cannot open your eye  Your eyeball bulges out or you cannot move your eye  You are more sleepy than normal, or you notice changes in your ability to think, move, or talk  You have swelling of your forehead or scalp  Call your doctor if:   You have vision changes, such as double vision  Your eye and eyelid are red, swollen, and painful  Your symptoms do not improve or go away after 10 days  You have nausea and are vomiting  Your nose is bleeding  You have questions or concerns about your condition or care  Medicines: Your symptoms may go away on their own  Your healthcare provider may recommend watchful waiting for up to 10 days before starting antibiotics  You may need any of the following:  Acetaminophen  decreases pain and fever  It is available without a doctor's order  Ask how much to take and how often to take it  Follow directions  Read the labels of all other medicines you are using to see if they also contain acetaminophen, or ask your doctor or pharmacist  Acetaminophen can cause liver damage if not taken correctly  Do not use more than 4 grams (4,000 milligrams) total of acetaminophen in one day  NSAIDs , such as ibuprofen, help decrease swelling, pain, and fever  This medicine is available with or without a doctor's order  NSAIDs can cause stomach bleeding or kidney problems in certain people   If you take blood thinner medicine, always ask your healthcare provider if NSAIDs are safe for you  Always read the medicine label and follow directions  Nasal steroid sprays  may help decrease inflammation in your nose and sinuses  Decongestants  help reduce swelling and drain mucus in the nose and sinuses  They may help you breathe easier  Antihistamines  help dry mucus in the nose and relieve sneezing  Antibiotics  help treat or prevent a bacterial infection  Take your medicine as directed  Contact your healthcare provider if you think your medicine is not helping or if you have side effects  Tell him or her if you are allergic to any medicine  Keep a list of the medicines, vitamins, and herbs you take  Include the amounts, and when and why you take them  Bring the list or the pill bottles to follow-up visits  Carry your medicine list with you in case of an emergency  Self-care:   Rinse your sinuses as directed  Use a sinus rinse device to rinse your nasal passages with a saline (salt water) solution or distilled water  Do not use tap water  This will help thin the mucus in your nose and rinse away pollen and dirt  It will also help reduce swelling so you can breathe normally  Use a humidifier  to increase air moisture in your home  This may make it easier for you to breathe and help decrease your cough  Sleep with your head elevated  Place an extra pillow under your head before you go to sleep to help your sinuses drain  Drink liquids as directed  Ask your healthcare provider how much liquid to drink each day and which liquids are best for you  Liquids will thin the mucus in your nose and help it drain  Avoid drinks that contain alcohol or caffeine  Do not smoke, and avoid secondhand smoke  Nicotine and other chemicals in cigarettes and cigars can make your symptoms worse  Ask your healthcare provider for information if you currently smoke and need help to quit  E-cigarettes or smokeless tobacco still contain nicotine   Talk to your healthcare provider before you use these products  Prevent the spread of germs:   Wash your hands often with soap and water  Wash your hands after you use the bathroom, change a child's diaper, or sneeze  Wash your hands before you prepare or eat food  Stay away from people who are sick  Some germs spread easily and quickly through contact  Follow up with your doctor as directed: You may be referred to an ear, nose, and throat specialist  Write down your questions so you remember to ask them during your visits  © Copyright [employer] 2022 Information is for End User's use only and may not be sold, redistributed or otherwise used for commercial purposes  All illustrations and images included in CareNotes® are the copyrighted property of [employer]  or [facility] [employer]   The above information is an  only  It is not intended as medical advice for individual conditions or treatments  Talk to your doctor, nurse or pharmacist before following any medical regimen to see if it is safe and effective for you  Conjunctivitis   WHAT YOU NEED TO KNOW:   Conjunctivitis, or pink eye, is inflammation of your conjunctiva  The conjunctiva is a thin tissue that covers the front of your eye and the back of your eyelids  The conjunctiva helps protect your eye and keep it moist  Conjunctivitis may be caused by bacteria, allergies, or a virus  If your conjunctivitis is caused by bacteria, it may get better on its own in about 7 days  Viral conjunctivitis can last up to 3 weeks  DISCHARGE INSTRUCTIONS:   Return to the emergency department if:   You have worsening eye pain  The swelling in your eye gets worse, even after treatment  Your vision suddenly becomes worse or you cannot see at all  Contact your healthcare provider if:   You develop a fever and ear pain  You have tiny bumps or spots of blood on your eye  You have questions or concerns about your condition or care      Manage your symptoms:   Apply a cool compress  Wet a washcloth with cold water and place it on your eye  This will help decrease itching and irritation  Do not wear contact lenses  They can irritate your eye  Throw away the pair you are using and ask when you can wear them again  Use a new pair of lenses when your healthcare provider says it is okay  Avoid irritants  Stay away from smoke filled areas  Shield your eyes from wind and sun  Flush your eye  You may need to flush your eye with saline to help decrease your symptoms  Ask for more information on how to flush your eye  Medicines:  Treatment depends on what is causing your conjunctivitis  You may be given any of the following: Allergy medicine  helps decrease itchy, red, swollen eyes caused by allergies  It may be given as a pill, eye drops, or nasal spray  Antibiotics  may be needed if your conjunctivitis is caused by bacteria  This medicine may be given as a pill, eye drops, or eye ointment  Take your medicine as directed  Contact your healthcare provider if you think your medicine is not helping or if you have side effects  Tell him or her if you are allergic to any medicine  Keep a list of the medicines, vitamins, and herbs you take  Include the amounts, and when and why you take them  Bring the list or the pill bottles to follow-up visits  Carry your medicine list with you in case of an emergency  Prevent the spread of conjunctivitis:   Wash your hands with soap and water often  Wash your hands before and after you touch your eyes  Also wash your hands before you prepare or eat food and after you use the bathroom or change a diaper  Avoid allergens  Try to avoid the things that cause your allergies, such as pets, dust, or grass  Avoid contact with others  Do not share towels or washcloths  Try to stay away from others as much as possible  Ask when you can return to work or school  Throw away eye makeup    The bacteria that caused your conjunctivitis can stay in eye makeup  Throw away mascara and other eye makeup  © Copyright [employer] 2022 Information is for End User's use only and may not be sold, redistributed or otherwise used for commercial purposes  All illustrations and images included in CareNotes® are the copyrighted property of [employer]  or [employer]  The above information is an  only  It is not intended as medical advice for individual conditions or treatments  Talk to your doctor, nurse or pharmacist before following any medical regimen to see if it is safe and effective for you

## 2022-12-26 ENCOUNTER — OFFICE VISIT (OUTPATIENT)
Dept: URGENT CARE | Facility: MEDICAL CENTER | Age: 37
End: 2022-12-26

## 2022-12-26 VITALS
HEIGHT: 72 IN | OXYGEN SATURATION: 97 % | WEIGHT: 293 LBS | HEART RATE: 94 BPM | BODY MASS INDEX: 39.68 KG/M2 | RESPIRATION RATE: 20 BRPM | TEMPERATURE: 98.7 F

## 2022-12-26 DIAGNOSIS — J01.01 ACUTE RECURRENT MAXILLARY SINUSITIS: Primary | ICD-10-CM

## 2022-12-26 DIAGNOSIS — J06.9 ACUTE URI: ICD-10-CM

## 2022-12-26 RX ORDER — BENZONATATE 200 MG/1
200 CAPSULE ORAL 3 TIMES DAILY PRN
Qty: 20 CAPSULE | Refills: 0 | Status: SHIPPED | OUTPATIENT
Start: 2022-12-26

## 2022-12-26 RX ORDER — AMOXICILLIN AND CLAVULANATE POTASSIUM 875; 125 MG/1; MG/1
1 TABLET, FILM COATED ORAL EVERY 12 HOURS SCHEDULED
Qty: 14 TABLET | Refills: 0 | Status: SHIPPED | OUTPATIENT
Start: 2022-12-26 | End: 2023-01-02

## 2022-12-26 NOTE — PROGRESS NOTES
[facility]        NAME: Roseanne Waller is a 42 y o  female  : 1985    MRN: [id number]  DATE: 2022  TIME: 9:35 AM    Assessment and Plan   Acute recurrent maxillary sinusitis [J01 01]  1  Acute recurrent maxillary sinusitis        2  Acute URI              Patient Instructions     Take antibiotic as instructed  Take Tessalon Perles for cough  Tylenol or ibuprofen for pain or fever  Drink plenty of fluids and rest   Try over-the-counter nasal spray for congestion  Follow up with PCP in 3-5 days  Proceed to  ER if symptoms worsen  Chief Complaint     Chief Complaint   Patient presents with   • Cold Like Symptoms     Sinus congestion and pressure, cough, wheezing at times, denies hx of asthma, symptoms started 2 weeks ago         History of Present Illness       75-year-old female here with sinus congestion, sinus pressure, and cough that began 2 weeks ago  He was seen here for a sinusitis that began after Thanksgiving  She improved with antibiotics but symptoms returned 1 week later  He also admits to a sore throat  Is any over-the-counter therapies  Denies any fevers, chills, ear pain, shortness of breath, chest pain, abdominal pain  Review of Systems   Review of Systems   Constitutional: Negative for chills and fever  HENT: Positive for congestion and sore throat  Negative for ear discharge and ear pain  Eyes: Negative  Respiratory: Positive for cough  Negative for shortness of breath and wheezing  Cardiovascular: Negative for chest pain and palpitations  Gastrointestinal: Negative  Musculoskeletal: Negative  Skin: Negative  Neurological: Negative            Current Medications       Current Outpatient Medications:   •  albuterol (ProAir HFA) 90 mcg/act inhaler, Inhale 2 puffs every 6 (six) hours as needed for wheezing (Patient not taking: Reported on 2022), Disp: 8 5 g, Rfl: 0  •  benzonatate (TESSALON PERLES) 100 mg capsule, Take 1 capsule (100 mg total) by mouth 3 (three) times a day as needed for cough (Patient not taking: Reported on 2022), Disp: 20 capsule, Rfl: 0  •  methylPREDNISolone 4 MG tablet therapy pack, Use as directed on package (Patient not taking: Reported on 2022), Disp: 1 each, Rfl: 0  •  prednisoLONE acetate (PRED FORTE) 1 % ophthalmic suspension, Administer 1 drop into the left eye 4 (four) times a day (Patient not taking: Reported on 2022), Disp: 5 mL, Rfl: 0    Current Allergies     Allergies as of 2022   • (No Known Allergies)            The following portions of the patient's history were reviewed and updated as appropriate: allergies, current medications, past family history, past medical history, past social history, past surgical history and problem list      Past Medical History:   Diagnosis Date   • Non-recurrent acute serous otitis media of right ear 2019       Past Surgical History:   Procedure Laterality Date   •  SECTION      x2   • CHOLECYSTECTOMY     • ENDOMETRIAL ABLATION         Family History   Problem Relation Age of Onset   • Thyroid cancer Mother    • No Known Problems Father          Medications have been verified  Objective   Pulse 94   Temp 98 7 °F (37 1 °C)   Resp 20   Ht 6' 1" (1 854 m)   Wt 136 kg (300 lb)   SpO2 97%   BMI 39 58 kg/m²        Physical Exam     Physical Exam  Constitutional:       Appearance: Normal appearance  HENT:      Head: Normocephalic and atraumatic  Right Ear: Tympanic membrane, ear canal and external ear normal       Left Ear: Tympanic membrane, ear canal and external ear normal       Nose: Mucosal edema, congestion and rhinorrhea present  Right Sinus: Maxillary sinus tenderness present  No frontal sinus tenderness  Left Sinus: Maxillary sinus tenderness present  No frontal sinus tenderness  Mouth/Throat:      Lips: Pink  Mouth: Mucous membranes are moist       Pharynx: Oropharynx is clear   Uvula midline  No oropharyngeal exudate or posterior oropharyngeal erythema  Tonsils: No tonsillar exudate or tonsillar abscesses  Eyes:      Conjunctiva/sclera: Conjunctivae normal    Cardiovascular:      Rate and Rhythm: Normal rate and regular rhythm  Pulses: Normal pulses  Heart sounds: Normal heart sounds  No murmur heard  No friction rub  No gallop  Pulmonary:      Effort: Pulmonary effort is normal  No respiratory distress  Breath sounds: Normal breath sounds  No stridor  No wheezing, rhonchi or rales  Chest:      Chest wall: No tenderness  Skin:     General: Skin is warm and dry  Capillary Refill: Capillary refill takes less than 2 seconds  Findings: No rash  Neurological:      General: No focal deficit present  Mental Status: She is alert

## 2022-12-26 NOTE — PATIENT INSTRUCTIONS
Take antibiotic as instructed  Take Tessalon Perles for cough  Tylenol or ibuprofen for pain or fever  Drink plenty of fluids and rest   Try over-the-counter nasal spray for congestion  Follow up with PCP in 3-5 days  Proceed to  ER if symptoms worsen  Sinusitis   WHAT YOU NEED TO KNOW:   Sinusitis is inflammation or infection of your sinuses  Sinusitis is most often caused by a virus  Acute sinusitis may last up to 12 weeks  Chronic sinusitis lasts longer than 12 weeks  Recurrent sinusitis means you have 4 or more infections in 1 year  DISCHARGE INSTRUCTIONS:   Return to the emergency department if:   You have trouble breathing or wheezing that is getting worse  You have a stiff neck, a fever, or a bad headache  You cannot open your eye  Your eyeball bulges out or you cannot move your eye  You are more sleepy than normal, or you notice changes in your ability to think, move, or talk  You have swelling of your forehead or scalp  Call your doctor if:   You have vision changes, such as double vision  Your eye and eyelid are red, swollen, and painful  Your symptoms do not improve or go away after 10 days  You have nausea and are vomiting  Your nose is bleeding  You have questions or concerns about your condition or care  Medicines: Your symptoms may go away on their own  Your healthcare provider may recommend watchful waiting for up to 10 days before starting antibiotics  You may need any of the following:  Acetaminophen  decreases pain and fever  It is available without a doctor's order  Ask how much to take and how often to take it  Follow directions  Read the labels of all other medicines you are using to see if they also contain acetaminophen, or ask your doctor or pharmacist  Acetaminophen can cause liver damage if not taken correctly  Do not use more than 4 grams (4,000 milligrams) total of acetaminophen in one day       NSAIDs , such as ibuprofen, help decrease swelling, pain, and fever  This medicine is available with or without a doctor's order  NSAIDs can cause stomach bleeding or kidney problems in certain people  If you take blood thinner medicine, always ask your healthcare provider if NSAIDs are safe for you  Always read the medicine label and follow directions  Nasal steroid sprays  may help decrease inflammation in your nose and sinuses  Decongestants  help reduce swelling and drain mucus in the nose and sinuses  They may help you breathe easier  Antihistamines  help dry mucus in the nose and relieve sneezing  Antibiotics  help treat or prevent a bacterial infection  Take your medicine as directed  Contact your healthcare provider if you think your medicine is not helping or if you have side effects  Tell him or her if you are allergic to any medicine  Keep a list of the medicines, vitamins, and herbs you take  Include the amounts, and when and why you take them  Bring the list or the pill bottles to follow-up visits  Carry your medicine list with you in case of an emergency  Self-care:   Rinse your sinuses as directed  Use a sinus rinse device to rinse your nasal passages with a saline (salt water) solution or distilled water  Do not use tap water  This will help thin the mucus in your nose and rinse away pollen and dirt  It will also help reduce swelling so you can breathe normally  Use a humidifier  to increase air moisture in your home  This may make it easier for you to breathe and help decrease your cough  Sleep with your head elevated  Place an extra pillow under your head before you go to sleep to help your sinuses drain  Drink liquids as directed  Ask your healthcare provider how much liquid to drink each day and which liquids are best for you  Liquids will thin the mucus in your nose and help it drain  Avoid drinks that contain alcohol or caffeine  Do not smoke, and avoid secondhand smoke    Nicotine and other chemicals in cigarettes and cigars can make your symptoms worse  Ask your healthcare provider for information if you currently smoke and need help to quit  E-cigarettes or smokeless tobacco still contain nicotine  Talk to your healthcare provider before you use these products  Prevent the spread of germs:   Wash your hands often with soap and water  Wash your hands after you use the bathroom, change a child's diaper, or sneeze  Wash your hands before you prepare or eat food  Stay away from people who are sick  Some germs spread easily and quickly through contact  Follow up with your doctor as directed: You may be referred to an ear, nose, and throat specialist  Write down your questions so you remember to ask them during your visits  © Copyright [employer] 2022 Information is for End User's use only and may not be sold, redistributed or otherwise used for commercial purposes  All illustrations and images included in CareNotes® are the copyrighted property of A D A M , Inc  or [facility]   The above information is an  only  It is not intended as medical advice for individual conditions or treatments  Talk to your doctor, nurse or pharmacist before following any medical regimen to see if it is safe and effective for you

## 2023-03-18 LAB — HBA1C MFR BLD HPLC: 5.6 %

## 2023-04-05 ENCOUNTER — HOSPITAL ENCOUNTER (OUTPATIENT)
Dept: RADIOLOGY | Facility: HOSPITAL | Age: 38
Discharge: HOME | End: 2023-04-05

## 2023-04-05 DIAGNOSIS — M54.30 SCIATICA, UNSPECIFIED LATERALITY: ICD-10-CM

## 2023-04-23 ENCOUNTER — OFFICE VISIT (OUTPATIENT)
Dept: URGENT CARE | Facility: CLINIC | Age: 38
End: 2023-04-23

## 2023-04-23 VITALS
BODY MASS INDEX: 39.68 KG/M2 | RESPIRATION RATE: 20 BRPM | OXYGEN SATURATION: 98 % | SYSTOLIC BLOOD PRESSURE: 141 MMHG | DIASTOLIC BLOOD PRESSURE: 92 MMHG | HEIGHT: 72 IN | HEART RATE: 82 BPM | TEMPERATURE: 98.3 F | WEIGHT: 293 LBS

## 2023-04-23 DIAGNOSIS — H10.30 ACUTE CONJUNCTIVITIS, UNSPECIFIED ACUTE CONJUNCTIVITIS TYPE, UNSPECIFIED LATERALITY: ICD-10-CM

## 2023-04-23 DIAGNOSIS — J01.90 ACUTE SINUSITIS, RECURRENCE NOT SPECIFIED, UNSPECIFIED LOCATION: Primary | ICD-10-CM

## 2023-04-23 RX ORDER — GABAPENTIN 300 MG/1
CAPSULE ORAL
COMMUNITY
Start: 2023-04-02

## 2023-04-23 RX ORDER — PREDNISOLONE ACETATE 10 MG/ML
1 SUSPENSION/ DROPS OPHTHALMIC 4 TIMES DAILY
Qty: 5 ML | Refills: 0 | Status: SHIPPED | OUTPATIENT
Start: 2023-04-23

## 2023-04-23 RX ORDER — AMOXICILLIN AND CLAVULANATE POTASSIUM 875; 125 MG/1; MG/1
1 TABLET, FILM COATED ORAL EVERY 12 HOURS SCHEDULED
Qty: 20 TABLET | Refills: 0 | Status: SHIPPED | OUTPATIENT
Start: 2023-04-23 | End: 2023-05-03

## 2023-04-23 NOTE — PROGRESS NOTES
[facility]    NAME: Katie Escudero is a 42 y o  female  : 1985    MRN: [id number]  DATE: 2023  TIME: 12:41 PM    Assessment and Plan   Acute sinusitis, recurrence not specified, unspecified location [J01 90]  1  Acute sinusitis, recurrence not specified, unspecified location  amoxicillin-clavulanate (AUGMENTIN) 875-125 mg per tablet      2  Acute conjunctivitis, unspecified acute conjunctivitis type, unspecified laterality  prednisoLONE acetate (PRED FORTE) 1 % ophthalmic suspension      patient reports allergies to many eye drops  Anytime she gets pink eye she is prescribed pred forte  Patient brought bottle in with her  Patient Instructions     Patient Instructions   I have prescribed an antibiotic for the infection  Please take the antibiotic as prescribed and finish the entire prescription  I recommend that the patient takes an over the counter probiotic or eats yogurt with live cultures in it [place]) to keep good bacteria in the gut and help prevent diarrhea  Wash hands frequently to prevent the spread of infection  Can use over the counter cough and cold medications to help with symptoms  Ibuprofen and/or tylenol as needed for pain or fever  If not improving over the next 7-10 days, follow up with PCP  Chief Complaint     Chief Complaint   Patient presents with   • Sinusitis     Sinus pain and pressure, nasal congestion for 3 weeks  Tried OTC meds with no improvement   • Earache     Bilateral ears clogged       History of Present Illness   42year old female here with complaint of ongoing sinus pressure, congestion, blocked ears for the past 2-3 weeks  Is taking allergy medication with no relief  Today woke up with eyes crusted  Review of Systems   Review of Systems   Constitutional: Negative for appetite change, chills and fever  HENT: Positive for congestion, postnasal drip and sinus pressure   Negative for ear discharge, ear pain, facial swelling, sneezing and sore throat  Respiratory: Positive for cough  Negative for shortness of breath and wheezing  Neurological: Negative for headaches         Current Medications     Current Outpatient Medications:   •  amoxicillin-clavulanate (AUGMENTIN) 875-125 mg per tablet, Take 1 tablet by mouth every 12 (twelve) hours for 10 days, Disp: 20 tablet, Rfl: 0  •  gabapentin (NEURONTIN) 300 mg capsule, TAKE 1 CAPSULE BY MOUTH 45 MINUTES BEFORE BED AS DIRECTED, Disp: , Rfl:   •  prednisoLONE acetate (PRED FORTE) 1 % ophthalmic suspension, Administer 1 drop to both eyes 4 (four) times a day, Disp: 5 mL, Rfl: 0  •  albuterol (ProAir HFA) 90 mcg/act inhaler, Inhale 2 puffs every 6 (six) hours as needed for wheezing (Patient not taking: Reported on 11/28/2022), Disp: 8 5 g, Rfl: 0  •  benzonatate (TESSALON PERLES) 100 mg capsule, Take 1 capsule (100 mg total) by mouth 3 (three) times a day as needed for cough (Patient not taking: Reported on 11/28/2022), Disp: 20 capsule, Rfl: 0  •  benzonatate (TESSALON) 200 MG capsule, Take 1 capsule (200 mg total) by mouth 3 (three) times a day as needed for cough (Patient not taking: Reported on 4/23/2023), Disp: 20 capsule, Rfl: 0  •  methylPREDNISolone 4 MG tablet therapy pack, Use as directed on package (Patient not taking: Reported on 11/28/2022), Disp: 1 each, Rfl: 0  •  prednisoLONE acetate (PRED FORTE) 1 % ophthalmic suspension, Administer 1 drop into the left eye 4 (four) times a day (Patient not taking: Reported on 12/26/2022), Disp: 5 mL, Rfl: 0    Current Allergies     Allergies as of 04/23/2023   • (No Known Allergies)          The following portions of the patient's history were reviewed and updated as appropriate: allergies, current medications, past family history, past medical history, past social history, past surgical history and problem list    Past Medical History:   Diagnosis Date   • Non-recurrent acute serous otitis media of right ear 9/30/2019     Past "Surgical History:   Procedure Laterality Date   •  SECTION      x2   • CHOLECYSTECTOMY     • ENDOMETRIAL ABLATION       Family History   Problem Relation Age of Onset   • Thyroid cancer Mother    • No Known Problems Father      Social History     Socioeconomic History   • Marital status: /Civil Union     Spouse name: Not on file   • Number of children: Not on file   • Years of education: Not on file   • Highest education level: Not on file   Occupational History   • Not on file   Tobacco Use   • Smoking status: Never     Passive exposure: Never   • Smokeless tobacco: Never   Vaping Use   • Vaping Use: Never used   Substance and Sexual Activity   • Alcohol use: Not Currently   • Drug use: Not Currently   • Sexual activity: Not Currently     Comment: defer   Other Topics Concern   • Not on file   Social History Narrative   • Not on file     Social Determinants of Health     Financial Resource Strain: Not on file   Food Insecurity: Not on file   Transportation Needs: Not on file   Physical Activity: Not on file   Stress: Not on file   Social Connections: Not on file   Intimate Partner Violence: Not on file   Housing Stability: Not on file     Medications have been verified  Objective   /92   Pulse 82   Temp 98 3 °F (36 8 °C)   Resp 20   Ht 6' 1\" (1 854 m)   Wt (!) 157 kg (346 lb)   SpO2 98%   BMI 45 65 kg/m²      Physical Exam   Physical Exam  Vitals and nursing note reviewed  Constitutional:       General: She is not in acute distress  Appearance: She is well-developed  HENT:      Head: Normocephalic and atraumatic  Right Ear: Tympanic membrane normal       Left Ear: Tympanic membrane normal       Nose: Congestion present  No mucosal edema or rhinorrhea  Right Sinus: No maxillary sinus tenderness or frontal sinus tenderness  Left Sinus: No maxillary sinus tenderness or frontal sinus tenderness        Mouth/Throat:      Pharynx: Posterior oropharyngeal erythema " present  No oropharyngeal exudate  Eyes:      General:         Right eye: Discharge present  Conjunctiva/sclera: Conjunctivae normal    Cardiovascular:      Rate and Rhythm: Normal rate and regular rhythm  Heart sounds: Normal heart sounds  No murmur heard  Pulmonary:      Effort: Pulmonary effort is normal       Breath sounds: Normal breath sounds

## 2023-06-09 ENCOUNTER — OFFICE VISIT (OUTPATIENT)
Dept: FAMILY MEDICINE CLINIC | Facility: CLINIC | Age: 38
End: 2023-06-09
Payer: COMMERCIAL

## 2023-06-09 VITALS
WEIGHT: 293 LBS | HEIGHT: 72 IN | HEART RATE: 85 BPM | BODY MASS INDEX: 39.68 KG/M2 | DIASTOLIC BLOOD PRESSURE: 70 MMHG | OXYGEN SATURATION: 100 % | SYSTOLIC BLOOD PRESSURE: 130 MMHG | TEMPERATURE: 97.9 F

## 2023-06-09 DIAGNOSIS — Z00.00 ANNUAL PHYSICAL EXAM: Primary | ICD-10-CM

## 2023-06-09 PROCEDURE — 99395 PREV VISIT EST AGE 18-39: CPT | Performed by: FAMILY MEDICINE

## 2023-06-09 RX ORDER — BENZONATATE 200 MG/1
200 CAPSULE ORAL 3 TIMES DAILY PRN
Qty: 20 CAPSULE | Refills: 0 | Status: SHIPPED | OUTPATIENT
Start: 2023-06-09 | End: 2023-06-09 | Stop reason: ALTCHOICE

## 2023-06-09 NOTE — ASSESSMENT & PLAN NOTE
This is a 49-year-old white female who presented to the office today for her annual physical   The patient and I reviewed her medications  She is still taking gabapentin 300 mg at bedtime  This is prescribed by podiatry  I had initially started the patient on this medication when she first started with her neuropathy symptoms  She does have blood work from her podiatrist to evaluate her for the neuropathy  She tells me she had thyroid function testing done in addition to B12 and blood glucose  I am unable to locate that and care everywhere or [facility]  I asked her to sign a release form so that I can get these results from her podiatrist   I am recommending she get an EMG which she did have in the past   At this point, they have no etiology for her neuropathy although she does have chronic lower back pain  I encouraged the patient to try to continue to lose weight  She is frustrated by the inability to lose weight but I do not think she should give up  I also encouraged her to try to exercise  She just has to try to do different exercises because of her chronic back and knee pain  I recommended swimming as she enjoys camping throughout the summer  I also recommended bike riding  I reviewed her immunization records  She is not vaccinated for COVID  Fortunately, she and her  has never gotten COVID  She is now working at the school as a paraprofessional   I recommend annual fall influenza vaccine    Patient will return to the office in 1 year

## 2023-06-09 NOTE — PROGRESS NOTES
ADULT ANNUAL [address] [place] [address],  [address] [facility]    NAME: Aric Fitzpatrick  AGE: 42 y o  SEX: female  : 1985     DATE: 2023     Assessment and Plan:     Problem List Items Addressed This Visit        Other    Annual physical exam - Primary     This is a 41-year-old white female who presented to the office today for her annual physical   The patient and I reviewed her medications  She is still taking gabapentin 300 mg at bedtime  This is prescribed by podiatry  I had initially started the patient on this medication when she first started with her neuropathy symptoms  She does have blood work from her podiatrist to evaluate her for the neuropathy  She tells me she had thyroid function testing done in addition to B12 and blood glucose  I am unable to locate that and care everywhere or [facility]  I asked her to sign a release form so that I can get these results from her podiatrist   I am recommending she get an EMG which she did have in the past   At this point, they have no etiology for her neuropathy although she does have chronic lower back pain  I encouraged the patient to try to continue to lose weight  She is frustrated by the inability to lose weight but I do not think she should give up  I also encouraged her to try to exercise  She just has to try to do different exercises because of her chronic back and knee pain  I recommended swimming as she enjoys camping throughout the summer  I also recommended bike riding  I reviewed her immunization records  She is not vaccinated for COVID  Fortunately, she and her  has never gotten COVID  She is now working at the school as a paraprofessional   I recommend annual fall influenza vaccine  Patient will return to the office in 1 year            Immunizations and preventive care screenings were discussed with patient today   Appropriate education was printed on patient's after visit summary  Counseling:  · Exercise: the importance of regular exercise/physical activity was discussed  Recommend exercise 3-5 times per week for at least 30 minutes  BMI Counseling: Body mass index is 44 92 kg/m²  The BMI is above normal  Nutrition recommendations include decreasing portion sizes and moderation in carbohydrate intake  Exercise recommendations include exercising 3-5 times per week  Rationale for BMI follow-up plan is due to patient being overweight or obese  Suggested alternative exercises for the patient such as riding a bike and swimming as she has problems with her back and knees  Depression Screening and Follow-up Plan: Patient was screened for depression during today's encounter  They screened negative with a PHQ-2 score of 0  Return in about 1 year (around 6/9/2024) for Annual physical      Chief Complaint:     Chief Complaint   Patient presents with   • Annual Exam      History of Present Illness:     Adult Annual Physical   Patient here for a comprehensive physical exam  The patient reports problems - neuropathy   Diet and Physical Activity  · Diet/Nutrition: poor diet  · Exercise: no formal exercise  Depression Screening  PHQ-2/9 Depression Screening    Little interest or pleasure in doing things: 0 - not at all  Feeling down, depressed, or hopeless: 0 - not at all  PHQ-2 Score: 0  PHQ-2 Interpretation: Negative depression screen       General Health  · Sleep: gets 7-8 hours of sleep on average  · Hearing: normal - bilateral   · Vision: no vision problems  · Dental: regular dental visits  /GYN Health  · Last menstrual period: 7 years ago (had ablation)  · Contraceptive method: tubal ligation  · History of STDs?: no      Review of Systems:     Review of Systems   Respiratory: Negative for cough and shortness of breath  Cardiovascular: Negative for chest pain, palpitations and leg swelling     Gastrointestinal: Negative for abdominal distention, "abdominal pain, blood in stool, constipation, diarrhea and nausea  Genitourinary: Negative for dysuria, frequency and urgency  Musculoskeletal: Positive for back pain  Neurological: Positive for numbness  Past Medical History:     Past Medical History:   Diagnosis Date   • Non-recurrent acute serous otitis media of right ear 2019      Past Surgical History:     Past Surgical History:   Procedure Laterality Date   •  SECTION      x2   • CHOLECYSTECTOMY     • ENDOMETRIAL ABLATION        Social History:     Social History     Socioeconomic History   • Marital status: /Civil Union     Spouse name: None   • Number of children: None   • Years of education: None   • Highest education level: None   Occupational History   • None   Tobacco Use   • Smoking status: Never     Passive exposure: Never   • Smokeless tobacco: Never   Vaping Use   • Vaping Use: Never used   Substance and Sexual Activity   • Alcohol use: Not Currently   • Drug use: Not Currently   • Sexual activity: Not Currently     Comment: defer   Other Topics Concern   • None   Social History Narrative   • None     Social Determinants of Health     Financial Resource Strain: Not on file   Food Insecurity: Not on file   Transportation Needs: Not on file   Physical Activity: Not on file   Stress: Not on file   Social Connections: Not on file   Intimate Partner Violence: Not on file   Housing Stability: Not on file      Family History:     Family History   Problem Relation Age of Onset   • Thyroid cancer Mother    • No Known Problems Father       Current Medications:     Current Outpatient Medications   Medication Sig Dispense Refill   • gabapentin (NEURONTIN) 300 mg capsule TAKE 1 CAPSULE BY MOUTH 45 MINUTES BEFORE BED AS DIRECTED       No current facility-administered medications for this visit        Allergies:     No Known Allergies   Physical Exam:     /70   Pulse 85   Temp 97 9 °F (36 6 °C) (Tympanic)   Ht 6' 1\" (1 854 m)  " Wt (!) 154 kg (340 lb 8 oz)   SpO2 100%   BMI 44 92 kg/m²     Physical Exam  Vitals reviewed  Constitutional:       Comments: This is a 38-year-old white female who appears her stated age  She is morbidly obese  She is pleasant and cooperative  She was in no distress  HENT:      Head: Normocephalic and atraumatic  Right Ear: Tympanic membrane, ear canal and external ear normal  There is no impacted cerumen  Left Ear: Tympanic membrane, ear canal and external ear normal  There is no impacted cerumen  Mouth/Throat:      Mouth: Mucous membranes are moist       Pharynx: Oropharynx is clear  No oropharyngeal exudate or posterior oropharyngeal erythema  Eyes:      General: No scleral icterus  Right eye: No discharge  Left eye: No discharge  Conjunctiva/sclera: Conjunctivae normal       Pupils: Pupils are equal, round, and reactive to light  Neck:      Comments: No thyromegaly  Cardiovascular:      Rate and Rhythm: Normal rate and regular rhythm  Heart sounds: Normal heart sounds  No murmur heard  No friction rub  No gallop  Pulmonary:      Effort: Pulmonary effort is normal  No respiratory distress  Breath sounds: Normal breath sounds  No stridor  No wheezing, rhonchi or rales  Abdominal:      General: Bowel sounds are normal  There is no distension  Palpations: Abdomen is soft  There is no mass  Tenderness: There is no abdominal tenderness  There is no guarding  Comments: No organomegaly was detected   Musculoskeletal:      Cervical back: Neck supple  Lymphadenopathy:      Cervical: No cervical adenopathy  Psychiatric:         Mood and Affect: Mood normal          Behavior: Behavior normal          Thought Content:  Thought content normal          Judgment: Judgment normal      Extremities: Without cyanosis, clubbing, or edema    Nick Thurston DO   [facility]

## 2023-10-11 ENCOUNTER — PROCEDURE VISIT (OUTPATIENT)
Dept: NEUROLOGY | Facility: CLINIC | Age: 38
End: 2023-10-11
Payer: COMMERCIAL

## 2023-10-11 DIAGNOSIS — R20.9 DISTURBANCE OF SKIN SENSATION: ICD-10-CM

## 2023-10-11 DIAGNOSIS — M54.17 LUMBOSACRAL NEURITIS: ICD-10-CM

## 2023-10-11 PROCEDURE — 95912 NRV CNDJ TEST 11-12 STUDIES: CPT | Performed by: PHYSICAL MEDICINE & REHABILITATION

## 2023-10-11 PROCEDURE — 95886 MUSC TEST DONE W/N TEST COMP: CPT | Performed by: PHYSICAL MEDICINE & REHABILITATION

## 2023-10-11 NOTE — PROGRESS NOTES
EMG 2 limb lower extremity     Date/Time  10/11/2023 11:15 AM     Performed by  Kaitlin Matta MD   Authorized by  Jimmy Shelley DPM           EMG bilateral lower extremity completed today.

## 2024-01-05 ENCOUNTER — OFFICE VISIT (OUTPATIENT)
Dept: URGENT CARE | Facility: CLINIC | Age: 39
End: 2024-01-05
Payer: COMMERCIAL

## 2024-01-05 VITALS
WEIGHT: 293 LBS | OXYGEN SATURATION: 97 % | RESPIRATION RATE: 18 BRPM | TEMPERATURE: 99 F | SYSTOLIC BLOOD PRESSURE: 136 MMHG | HEIGHT: 72 IN | HEART RATE: 85 BPM | DIASTOLIC BLOOD PRESSURE: 80 MMHG | BODY MASS INDEX: 39.68 KG/M2

## 2024-01-05 DIAGNOSIS — J01.90 ACUTE SINUSITIS, RECURRENCE NOT SPECIFIED, UNSPECIFIED LOCATION: Primary | ICD-10-CM

## 2024-01-05 PROCEDURE — 99213 OFFICE O/P EST LOW 20 MIN: CPT | Performed by: FAMILY MEDICINE

## 2024-01-05 RX ORDER — FLUCONAZOLE 150 MG/1
TABLET ORAL
COMMUNITY

## 2024-01-05 RX ORDER — FLUCONAZOLE 150 MG/1
TABLET ORAL
Qty: 2 TABLET | Refills: 0 | Status: SHIPPED | OUTPATIENT
Start: 2024-01-05 | End: 2024-01-12

## 2024-01-05 RX ORDER — AMOXICILLIN 875 MG/1
875 TABLET, COATED ORAL 2 TIMES DAILY
Qty: 20 TABLET | Refills: 0 | Status: SHIPPED | OUTPATIENT
Start: 2024-01-05 | End: 2024-01-15

## 2024-01-05 RX ORDER — NYSTATIN 100000 [USP'U]/G
POWDER TOPICAL
COMMUNITY

## 2024-01-05 RX ORDER — DICLOFENAC SODIUM 75 MG/1
1 TABLET, DELAYED RELEASE ORAL 2 TIMES DAILY
COMMUNITY

## 2024-01-05 NOTE — PROGRESS NOTES
[facility]    NAME: Violette Fitzpatrick is a 38 y.o. female  : 1985    MRN: [id number]  DATE: 2024  TIME: 4:19 PM    Assessment and Plan   Acute sinusitis, recurrence not specified, unspecified location [J01.90]  1. Acute sinusitis, recurrence not specified, unspecified location  amoxicillin (AMOXIL) 875 mg tablet    fluconazole (DIFLUCAN) 150 mg tablet          Patient Instructions     Patient Instructions   I have prescribed an antibiotic for the infection.  Please take the antibiotic as prescribed and finish the entire prescription.  I recommend that the patient takes an over the counter probiotic or eats yogurt with live cultures in it (activia) to keep good bacteria in the gut and help prevent diarrhea.  Wash hands frequently to prevent the spread of infection.  Can use over the counter cough and cold medications to help with symptoms.  Ibuprofen and/or tylenol as needed for pain or fever.  If not improving over the next 7-10 days, follow up with PCP.        Chief Complaint     Chief Complaint   Patient presents with    Cold Like Symptoms     Sinus congestion, headache, post nasal drip, slight cough for 1 week       History of Present Illness   38-year-old female here with complaint of sinus pressure, nasal congestion, headaches postnasal drip.  Symptoms have been present for the last week.        Review of Systems   Review of Systems   Constitutional:  Positive for fatigue. Negative for appetite change, chills and fever.   HENT:  Positive for congestion, postnasal drip, sinus pressure and sore throat. Negative for ear discharge, ear pain, facial swelling and sneezing.    Respiratory:  Positive for cough. Negative for shortness of breath and wheezing.    Neurological:  Positive for headaches.       Current Medications     Current Outpatient Medications:     amoxicillin (AMOXIL) 875 mg tablet, Take 1 tablet (875 mg total) by mouth 2 (two) times a day for 10 days, Disp: 20 tablet, Rfl: 0     fluconazole (DIFLUCAN) 150 mg tablet, Take 1 at onset of symptoms and 1 when finished with antibiotic, Disp: 2 tablet, Rfl: 0    gabapentin (NEURONTIN) 300 mg capsule, TAKE 1 CAPSULE BY MOUTH 45 MINUTES BEFORE BED AS DIRECTED, Disp: , Rfl:     diclofenac (VOLTAREN) 75 mg EC tablet, Take 1 tablet by mouth 2 (two) times a day (Patient not taking: Reported on 2024), Disp: , Rfl:     fluconazole (DIFLUCAN) 150 mg tablet, , Disp: , Rfl:     nystatin (MYCOSTATIN) powder, APPLY POWDER TOPICALLY BENEATH THE BREASTS AND / OR IN THE GROIN TWICE DAILY AS NEEDED FOR ITCHING / RASH (Patient not taking: Reported on 2024), Disp: , Rfl:     Current Allergies     Allergies as of 2024    (No Known Allergies)          The following portions of the patient's history were reviewed and updated as appropriate: allergies, current medications, past family history, past medical history, past social history, past surgical history and problem list.   Past Medical History:   Diagnosis Date    Non-recurrent acute serous otitis media of right ear 2019     Past Surgical History:   Procedure Laterality Date     SECTION      x2    CHOLECYSTECTOMY      ENDOMETRIAL ABLATION       Family History   Problem Relation Age of Onset    Thyroid cancer Mother     No Known Problems Father      Social History     Socioeconomic History    Marital status: /Civil Union     Spouse name: Not on file    Number of children: Not on file    Years of education: Not on file    Highest education level: Not on file   Occupational History    Not on file   Tobacco Use    Smoking status: Never     Passive exposure: Never    Smokeless tobacco: Never   Vaping Use    Vaping status: Never Used   Substance and Sexual Activity    Alcohol use: Not Currently    Drug use: Not Currently    Sexual activity: Not Currently     Comment: defer   Other Topics Concern    Not on file   Social History Narrative    Not on file     Social Determinants of Health  "    Financial Resource Strain: Not on file   Food Insecurity: Not on file   Transportation Needs: Not on file   Physical Activity: Not on file   Stress: Not on file   Social Connections: Not on file   Intimate Partner Violence: Not on file   Housing Stability: Not on file     Medications have been verified.    Objective   /80   Pulse 85   Temp 99 °F (37.2 °C) (Temporal)   Resp 18   Ht 6' 1\" (1.854 m)   Wt (!) 141 kg (310 lb)   SpO2 97%   BMI 40.90 kg/m²      Physical Exam   Physical Exam  Vitals and nursing note reviewed.   Constitutional:       General: She is not in acute distress.     Appearance: She is well-developed.   HENT:      Head: Normocephalic and atraumatic.      Right Ear: Tympanic membrane normal.      Left Ear: Tympanic membrane normal.      Nose: Mucosal edema and congestion present.      Right Sinus: Maxillary sinus tenderness and frontal sinus tenderness present.      Left Sinus: Maxillary sinus tenderness and frontal sinus tenderness present.      Mouth/Throat:      Pharynx: Posterior oropharyngeal erythema present. No oropharyngeal exudate.   Eyes:      Conjunctiva/sclera: Conjunctivae normal.   Cardiovascular:      Rate and Rhythm: Normal rate and regular rhythm.      Heart sounds: Normal heart sounds. No murmur heard.  Pulmonary:      Effort: Pulmonary effort is normal. No respiratory distress.      Breath sounds: Normal breath sounds.                     "

## 2024-01-28 ENCOUNTER — OFFICE VISIT (OUTPATIENT)
Dept: URGENT CARE | Facility: MEDICAL CENTER | Age: 39
End: 2024-01-28
Payer: COMMERCIAL

## 2024-01-28 VITALS
RESPIRATION RATE: 18 BRPM | TEMPERATURE: 98.2 F | WEIGHT: 293 LBS | SYSTOLIC BLOOD PRESSURE: 114 MMHG | OXYGEN SATURATION: 96 % | BODY MASS INDEX: 44.73 KG/M2 | HEART RATE: 105 BPM | DIASTOLIC BLOOD PRESSURE: 80 MMHG

## 2024-01-28 DIAGNOSIS — H10.33 ACUTE CONJUNCTIVITIS OF BOTH EYES, UNSPECIFIED ACUTE CONJUNCTIVITIS TYPE: ICD-10-CM

## 2024-01-28 DIAGNOSIS — J01.11 ACUTE RECURRENT FRONTAL SINUSITIS: Primary | ICD-10-CM

## 2024-01-28 PROCEDURE — 99213 OFFICE O/P EST LOW 20 MIN: CPT | Performed by: STUDENT IN AN ORGANIZED HEALTH CARE EDUCATION/TRAINING PROGRAM

## 2024-01-28 RX ORDER — OLOPATADINE HYDROCHLORIDE 1 MG/ML
1 SOLUTION/ DROPS OPHTHALMIC 2 TIMES DAILY
Qty: 5 ML | Refills: 0 | Status: SHIPPED | OUTPATIENT
Start: 2024-01-28

## 2024-01-28 RX ORDER — OFLOXACIN 3 MG/ML
1 SOLUTION/ DROPS OPHTHALMIC 4 TIMES DAILY
Qty: 5 ML | Refills: 0 | Status: SHIPPED | OUTPATIENT
Start: 2024-01-28

## 2024-01-28 RX ORDER — AMOXICILLIN AND CLAVULANATE POTASSIUM 875; 125 MG/1; MG/1
1 TABLET, FILM COATED ORAL EVERY 12 HOURS SCHEDULED
Qty: 28 TABLET | Refills: 0 | Status: SHIPPED | OUTPATIENT
Start: 2024-01-28 | End: 2024-02-11

## 2024-01-28 NOTE — PROGRESS NOTES
[facility]        NAME: Violette Fitzpatrick is a 38 y.o. female  : 1985    MRN: [id number]    Assessment and Plan   Acute recurrent frontal sinusitis [J01.11]  1. Acute recurrent frontal sinusitis  amoxicillin-clavulanate (AUGMENTIN) 875-125 mg per tablet      2. Acute conjunctivitis of both eyes, unspecified acute conjunctivitis type  ofloxacin (OCUFLOX) 0.3 % ophthalmic solution    olopatadine (PATANOL) 0.1 % ophthalmic solution        Recurrent nature of sinusitis.  Will treat with 14-day course of Augmentin.  Discussed symptomatic and supportive measures for management.  Discussed need for sinus cleansing with saline rinses to help remove congestion which harbors bacteria causing recurrent infections.    Bilateral conjunctivitis is very minimal, this along with the itching indicates more likely an allergic etiology instead of a bacterial etiology but she did have crusting in her eyes were glued shut this morning which can be bacterial.  No discharge noted on exam at this time.  Patient is agreeable to for start off with Patanol eyedrops to see if this gives her any improvement in which case conjunctivitis is likely allergic.  If she does not notice improvement in 2 to 3 days or has significant worsening over the next 24 hours, I have instructed her to then switch to ofloxacin eyedrops which she has tried in the past without adverse effects.  Instructed to stop using prednisolone drops.    Patient Instructions     See wrap up for details  Follow up with PCP in 3-5 days.  Proceed to  ER if symptoms worsen.    Chief Complaint     Chief Complaint   Patient presents with    Eye Problem     B/L eye irration and crustiness this am. Started applying eye drops from a previous infection    Sinusitis     Sx started on . Had sinus infection beginning of January went away and now is back. Complete course of amoxicillin         History of Present Illness   /80   Pulse 105   Temp 98.2 °F (36.8 °C)    Resp 18   Wt (!) 154 kg (339 lb)   SpO2 96%   BMI 44.73 kg/m²     HPI    Was seen earlier in the month for sinusitis, tx'ed with 10 day amoxicillin course with improvement but then had recurrence of symptoms a week after which was a week ago. Reports BL ear pain and pressure, frontal sinus pressure, congestion, productive cough, purulent rhinorrhea. Tried mucinex DM, vicks sinus with mild relief.     Does not smoke or vape.     Reports eye redness, crusted shut this morning with itchiness. Reports trying tobramycin and erythromycin ointment in the past which caused worsening of pink eye so she was given prednisolone which helps her. She started using the prednisolone eye drops again yesterday without any improvement so far. Denies pain with eye movement. Does have a history of seasonal allergies and is currently taking Claritin and Zyrtec daily.    Review of Systems   Review of Systems   Constitutional:  Negative for chills and fever.   HENT:  Positive for congestion, ear pain, postnasal drip, rhinorrhea, sinus pressure and sinus pain. Negative for sore throat.    Eyes:  Positive for discharge, redness and itching. Negative for pain and visual disturbance.   Respiratory:  Positive for cough. Negative for chest tightness and shortness of breath.    Cardiovascular:  Negative for chest pain and palpitations.   Gastrointestinal:  Negative for abdominal pain, constipation, diarrhea, nausea and vomiting.   Genitourinary:  Negative for dysuria, hematuria and menstrual problem.   Musculoskeletal:  Negative for arthralgias and back pain.   Skin:  Negative for color change and rash.   Neurological:  Negative for seizures and syncope.   Psychiatric/Behavioral:  Negative for dysphoric mood and suicidal ideas.    All other systems reviewed and are negative.    Current Medications       Current Outpatient Medications:     amoxicillin-clavulanate (AUGMENTIN) 875-125 mg per tablet, Take 1 tablet by mouth every 12 (twelve)  hours for 14 days, Disp: 28 tablet, Rfl: 0    gabapentin (NEURONTIN) 300 mg capsule, TAKE 1 CAPSULE BY MOUTH 45 MINUTES BEFORE BED AS DIRECTED, Disp: , Rfl:     ofloxacin (OCUFLOX) 0.3 % ophthalmic solution, Administer 1 drop to both eyes 4 (four) times a day, Disp: 5 mL, Rfl: 0    olopatadine (PATANOL) 0.1 % ophthalmic solution, Administer 1 drop to both eyes 2 (two) times a day, Disp: 5 mL, Rfl: 0    diclofenac (VOLTAREN) 75 mg EC tablet, Take 1 tablet by mouth 2 (two) times a day (Patient not taking: Reported on 2024), Disp: , Rfl:     fluconazole (DIFLUCAN) 150 mg tablet, , Disp: , Rfl:     nystatin (MYCOSTATIN) powder, APPLY POWDER TOPICALLY BENEATH THE BREASTS AND / OR IN THE GROIN TWICE DAILY AS NEEDED FOR ITCHING / RASH (Patient not taking: Reported on 2024), Disp: , Rfl:     Current Allergies     Allergies as of 2024    (No Known Allergies)            The following portions of the patient's history were reviewed and updated as appropriate: allergies, current medications, past family history, past medical history, past social history, past surgical history and problem list.     Past Medical History:   Diagnosis Date    Non-recurrent acute serous otitis media of right ear 2019       Past Surgical History:   Procedure Laterality Date     SECTION      x2    CHOLECYSTECTOMY      ENDOMETRIAL ABLATION         Family History   Problem Relation Age of Onset    Thyroid cancer Mother     No Known Problems Father          Medications have been verified.        Objective   /80   Pulse 105   Temp 98.2 °F (36.8 °C)   Resp 18   Wt (!) 154 kg (339 lb)   SpO2 96%   BMI 44.73 kg/m²        Physical Exam     Physical Exam  Constitutional:       General: She is not in acute distress.     Appearance: Normal appearance.   HENT:      Head: Normocephalic and atraumatic.      Right Ear: Ear canal normal. A middle ear effusion is present. Tympanic membrane is not erythematous or bulging.       Left Ear: Ear canal normal. A middle ear effusion is present. Tympanic membrane is not erythematous or bulging.      Nose: Congestion present.      Right Sinus: Frontal sinus tenderness present. No maxillary sinus tenderness.      Left Sinus: Frontal sinus tenderness present. No maxillary sinus tenderness.      Mouth/Throat:      Mouth: Mucous membranes are moist.      Pharynx: Oropharynx is clear. Posterior oropharyngeal erythema present.   Eyes:      General: Lids are normal.         Right eye: No discharge or hordeolum.         Left eye: No discharge or hordeolum.      Extraocular Movements: Extraocular movements intact.      Conjunctiva/sclera:      Right eye: Right conjunctiva is injected.      Left eye: Left conjunctiva is injected.      Comments: Very minimal conjunctival injection   Cardiovascular:      Rate and Rhythm: Normal rate and regular rhythm.   Pulmonary:      Effort: Pulmonary effort is normal. No respiratory distress.      Breath sounds: Normal breath sounds.   Lymphadenopathy:      Cervical: No cervical adenopathy.   Skin:     General: Skin is warm and dry.   Neurological:      General: No focal deficit present.      Mental Status: She is alert and oriented to person, place, and time.   Psychiatric:         Mood and Affect: Mood normal.         Behavior: Behavior normal.

## 2024-01-28 NOTE — PATIENT INSTRUCTIONS
Saline sinus rinse (like NeilMed Sinus Rinse) twice daily followed by flonase twice daily  Tylenol Sinus and Mucinex over the counter  Warm compresses over sinuses  Steam treatment (practice proper safety precautions when handing hot liquids/steam)  Over the counter saline nasal spray    Start using pataday eye drops. If no improvement in 2-3 days or if symptoms worsen significantly in the next 24 hours, you can start the antibiotic eye drop ofloxacin

## 2024-02-21 PROBLEM — H65.01 NON-RECURRENT ACUTE SEROUS OTITIS MEDIA OF RIGHT EAR: Status: RESOLVED | Noted: 2019-09-30 | Resolved: 2024-02-21

## 2024-02-21 PROBLEM — J01.90 ACUTE NON-RECURRENT SINUSITIS: Status: RESOLVED | Noted: 2019-09-30 | Resolved: 2024-02-21

## 2025-01-31 ENCOUNTER — TELEPHONE (OUTPATIENT)
Dept: ADMINISTRATIVE | Facility: OTHER | Age: 40
End: 2025-01-31

## 2025-01-31 NOTE — TELEPHONE ENCOUNTER
----- Message from Kimberly PURCELL sent at 1/31/2025  8:55 AM EST -----  Regarding: care gap request  01/31/25 8:55 AM    Hello, our patient attached above has had Pap Smear (HPV) aka Cervical Cancer Screening completed/performed. Please assist in updating the patient chart by making an External outreach to [facility] facility located in [place]. The date of service is 6/28/23.    Thank you,  Kimberly Noble  [facility]

## 2025-02-03 ENCOUNTER — OFFICE VISIT (OUTPATIENT)
Dept: FAMILY MEDICINE CLINIC | Facility: CLINIC | Age: 40
End: 2025-02-03
Payer: COMMERCIAL

## 2025-02-03 VITALS
HEART RATE: 85 BPM | OXYGEN SATURATION: 99 % | TEMPERATURE: 98.3 F | DIASTOLIC BLOOD PRESSURE: 82 MMHG | BODY MASS INDEX: 39.68 KG/M2 | HEIGHT: 72 IN | SYSTOLIC BLOOD PRESSURE: 126 MMHG | WEIGHT: 293 LBS

## 2025-02-03 DIAGNOSIS — L30.9 ECZEMA, UNSPECIFIED TYPE: Primary | ICD-10-CM

## 2025-02-03 PROCEDURE — 99213 OFFICE O/P EST LOW 20 MIN: CPT | Performed by: FAMILY MEDICINE

## 2025-02-03 RX ORDER — TRIAMCINOLONE ACETONIDE 1 MG/G
CREAM TOPICAL 2 TIMES DAILY
Qty: 80 G | Refills: 1 | Status: SHIPPED | OUTPATIENT
Start: 2025-02-03

## 2025-02-03 NOTE — PROGRESS NOTES
"Name: Violette Fitzpatrick      : 1985      MRN: [id number]  Encounter Provider: Nick Thurston DO  Encounter Date: 2/3/2025   Encounter department: [facility]  :  Assessment & Plan  Eczema, unspecified type  Patient has eczema.  We discussed the diagnosis.  I told her that this will be an ongoing issue but likely improve in the spring when the weather improves.  In the meantime, she should use the cream until the rash resolves.  When it comes back she should go back on the prescription cream.  In between, I told her to use a good moisturizing lotion on her skin such as Vaseline intensive care or Lubriderm.  Patient was started on triamcinolone 0.1% cream.  She is going to apply this to the affected areas twice daily.  She may use this as needed.  Return if no improvement or worsens.  Orders:    triamcinolone (KENALOG) 0.1 % cream; Apply topically 2 (two) times a day          Depression Screening and Follow-up Plan: Patient was screened for depression during today's encounter. They screened negative with a PHQ-2 score of 0.      History of Present Illness   This is a 39-year-old white female who presents to the office today complaining of a rash on her legs and arms.  She noticed it first about 9 days ago.  She tells me it does not itch.  She has not changed soaps or detergents or fabric softeners etc.  She has been on no new medications.      Review of Systems   Skin:  Positive for rash.       Objective   /82 (BP Location: Left arm, Patient Position: Sitting, Cuff Size: Thigh)   Pulse 85   Temp 98.3 °F (36.8 °C) (Tympanic)   Ht 6' 1\" (1.854 m)   Wt (!) 156 kg (344 lb 6.4 oz)   SpO2 99%   BMI 45.44 kg/m²      Physical Exam  Vitals reviewed.   Constitutional:       Comments: The patient is a pleasant 39-year-old white female who appears her stated age.  She is in no apparent distress   Skin:     Comments: Dry skin noted on both legs.  1 small patch present on the patient's left " anterior leg which was slightly raised red and scaly.  There was a red scaly rash present on the patient's left arm.  In the popliteal fossa there is also a red scaly rash present

## 2025-02-03 NOTE — TELEPHONE ENCOUNTER
Upon review of the In Basket request we were able to locate, review, and update the patient chart as requested for Pap Smear (HPV) aka Cervical Cancer Screening.    Any additional questions or concerns should be emailed to the Practice Liaisons via the appropriate education email address, please do not reply via In Basket.    Thank you  Kaia Newman MA   [facility]

## 2025-02-04 NOTE — ASSESSMENT & PLAN NOTE
Patient has eczema.  We discussed the diagnosis.  I told her that this will be an ongoing issue but likely improve in the spring when the weather improves.  In the meantime, she should use the cream until the rash resolves.  When it comes back she should go back on the prescription cream.  In between, I told her to use a good moisturizing lotion on her skin such as Vaseline intensive care or Lubriderm.  Patient was started on triamcinolone 0.1% cream.  She is going to apply this to the affected areas twice daily.  She may use this as needed.  Return if no improvement or worsens.  Orders:    triamcinolone (KENALOG) 0.1 % cream; Apply topically 2 (two) times a day

## 2025-02-21 ENCOUNTER — OFFICE VISIT (OUTPATIENT)
Dept: URGENT CARE | Facility: CLINIC | Age: 40
End: 2025-02-21
Payer: COMMERCIAL

## 2025-02-21 VITALS
DIASTOLIC BLOOD PRESSURE: 87 MMHG | WEIGHT: 293 LBS | TEMPERATURE: 98.5 F | HEIGHT: 72 IN | OXYGEN SATURATION: 97 % | SYSTOLIC BLOOD PRESSURE: 136 MMHG | BODY MASS INDEX: 39.68 KG/M2 | RESPIRATION RATE: 20 BRPM

## 2025-02-21 DIAGNOSIS — J01.40 ACUTE PANSINUSITIS, RECURRENCE NOT SPECIFIED: Primary | ICD-10-CM

## 2025-02-21 PROCEDURE — S9083 URGENT CARE CENTER GLOBAL: HCPCS | Performed by: PHYSICIAN ASSISTANT

## 2025-02-21 PROCEDURE — G0382 LEV 3 HOSP TYPE B ED VISIT: HCPCS | Performed by: PHYSICIAN ASSISTANT

## 2025-02-21 RX ORDER — FLUCONAZOLE 150 MG/1
150 TABLET ORAL ONCE
Qty: 1 TABLET | Refills: 0 | Status: SHIPPED | OUTPATIENT
Start: 2025-02-21 | End: 2025-02-21

## 2025-02-21 RX ORDER — AMOXICILLIN 875 MG/1
875 TABLET, COATED ORAL 2 TIMES DAILY
Qty: 20 TABLET | Refills: 0 | Status: SHIPPED | OUTPATIENT
Start: 2025-02-21 | End: 2025-03-03

## 2025-02-21 NOTE — PATIENT INSTRUCTIONS
I have prescribed an antibiotic for the infection.  Please take the antibiotic as prescribed and finish the entire prescription.  Take an over the counter probiotic or eat yogurt with live cultures in it (activia) to keep good bacteria in the gut and help prevent diarrhea.  Wash hands frequently to prevent the spread of infection.  Can use over the counter cough and cold medications to help with symptoms.  Ibuprofen and/or tylenol as needed for pain or fever.  If not improving over the next 7-10 days, follow up with PCP.      Diflucan as needed

## 2025-02-21 NOTE — PROGRESS NOTES
[facility]    NAME: Violette Fitzpatrick is a 39 y.o. female  : 1985    MRN: [id number]  DATE: 2025  TIME: 4:33 PM    Assessment and Plan   Acute pansinusitis, recurrence not specified [J01.40]  1. Acute pansinusitis, recurrence not specified  amoxicillin (AMOXIL) 875 mg tablet    fluconazole (DIFLUCAN) 150 mg tablet          Patient Instructions     Patient Instructions   I have prescribed an antibiotic for the infection.  Please take the antibiotic as prescribed and finish the entire prescription.  Take an over the counter probiotic or eat yogurt with live cultures in it (activia) to keep good bacteria in the gut and help prevent diarrhea.  Wash hands frequently to prevent the spread of infection.  Can use over the counter cough and cold medications to help with symptoms.  Ibuprofen and/or tylenol as needed for pain or fever.  If not improving over the next 7-10 days, follow up with PCP.      Diflucan as needed    Chief Complaint     Chief Complaint   Patient presents with    Sinusitis     Sinus pain and pressure for 2 weeks, with NO fever. Taking OTC decongestants and expectorants.        History of Present Illness   39 year old female here with complaint of nasal and sinus congestion for over week.  No improving with over the counter medications, neti-pot        Review of Systems   Review of Systems   Constitutional:  Negative for appetite change, chills and fever.   HENT:  Positive for congestion, postnasal drip, sinus pressure and sore throat. Negative for ear discharge, ear pain, facial swelling and sneezing.    Respiratory:  Negative for cough, shortness of breath and wheezing.    Neurological:  Negative for headaches.       Current Medications     Current Outpatient Medications:     amoxicillin (AMOXIL) 875 mg tablet, Take 1 tablet (875 mg total) by mouth 2 (two) times a day for 10 days, Disp: 20 tablet, Rfl: 0    fluconazole (DIFLUCAN) 150 mg tablet, Take 1 tablet (150 mg total) by  mouth once for 1 dose, Disp: 1 tablet, Rfl: 0    gabapentin (NEURONTIN) 300 mg capsule, TAKE 1 CAPSULE BY MOUTH 45 MINUTES BEFORE BED AS DIRECTED, Disp: , Rfl:     triamcinolone (KENALOG) 0.1 % cream, Apply topically 2 (two) times a day, Disp: 80 g, Rfl: 1    Current Allergies     Allergies as of 2025    (No Known Allergies)          The following portions of the patient's history were reviewed and updated as appropriate: allergies, current medications, past family history, past medical history, past social history, past surgical history and problem list.   Past Medical History:   Diagnosis Date    Allergic     Arthritis     Eczema 2/3/2025    Non-recurrent acute serous otitis media of right ear 2019     Past Surgical History:   Procedure Laterality Date     SECTION      x2    CHOLECYSTECTOMY      ENDOMETRIAL ABLATION       Family History   Problem Relation Age of Onset    Thyroid cancer Mother     No Known Problems Father      Social History     Socioeconomic History    Marital status: /Civil Union     Spouse name: Not on file    Number of children: Not on file    Years of education: Not on file    Highest education level: Not on file   Occupational History    Not on file   Tobacco Use    Smoking status: Never     Passive exposure: Never    Smokeless tobacco: Never   Vaping Use    Vaping status: Never Used   Substance and Sexual Activity    Alcohol use: Not Currently    Drug use: Never    Sexual activity: Not Currently     Comment: defer   Other Topics Concern    Not on file   Social History Narrative    Not on file     Social Drivers of Health     Financial Resource Strain: Not on file   Food Insecurity: Not on file   Transportation Needs: Not on file   Physical Activity: Not on file   Stress: Not on file   Social Connections: Not on file   Intimate Partner Violence: Not on file   Housing Stability: Not on file     Medications have been verified.    Objective   /87   Temp 98.5 °F  "(36.9 °C)   Resp 20   Ht 6' 1\" (1.854 m)   Wt (!) 156 kg (343 lb 6.4 oz)   SpO2 97%   BMI 45.31 kg/m²      Physical Exam   Physical Exam  Vitals and nursing note reviewed.   Constitutional:       General: She is not in acute distress.     Appearance: She is well-developed.   HENT:      Head: Normocephalic and atraumatic.      Right Ear: Tympanic membrane normal.      Left Ear: Tympanic membrane normal.      Nose: Mucosal edema and congestion present.      Right Sinus: Maxillary sinus tenderness present. No frontal sinus tenderness.      Left Sinus: Maxillary sinus tenderness present. No frontal sinus tenderness.      Mouth/Throat:      Pharynx: No oropharyngeal exudate or posterior oropharyngeal erythema.   Eyes:      Conjunctiva/sclera: Conjunctivae normal.   Cardiovascular:      Rate and Rhythm: Normal rate and regular rhythm.      Heart sounds: Normal heart sounds. No murmur heard.  Pulmonary:      Effort: Pulmonary effort is normal.      Breath sounds: Normal breath sounds.                     "